# Patient Record
Sex: MALE | Race: WHITE | NOT HISPANIC OR LATINO | Employment: FULL TIME | URBAN - METROPOLITAN AREA
[De-identification: names, ages, dates, MRNs, and addresses within clinical notes are randomized per-mention and may not be internally consistent; named-entity substitution may affect disease eponyms.]

---

## 2022-02-02 ENCOUNTER — HISTORICAL (OUTPATIENT)
Dept: ADMINISTRATIVE | Facility: HOSPITAL | Age: 67
End: 2022-02-02

## 2022-02-14 ENCOUNTER — HISTORICAL (OUTPATIENT)
Dept: ADMINISTRATIVE | Facility: HOSPITAL | Age: 67
End: 2022-02-14

## 2022-07-10 ENCOUNTER — HOSPITAL ENCOUNTER (EMERGENCY)
Facility: HOSPITAL | Age: 67
Discharge: HOME | End: 2022-07-10
Attending: EMERGENCY MEDICINE
Payer: MEDICARE

## 2022-07-10 VITALS
OXYGEN SATURATION: 98 % | TEMPERATURE: 98 F | DIASTOLIC BLOOD PRESSURE: 91 MMHG | WEIGHT: 175.06 LBS | BODY MASS INDEX: 25.06 KG/M2 | HEIGHT: 70 IN | HEART RATE: 93 BPM | SYSTOLIC BLOOD PRESSURE: 140 MMHG | RESPIRATION RATE: 18 BRPM
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
## 2022-07-10 DIAGNOSIS — S01.01XA OCCIPITAL SCALP LACERATION, INITIAL ENCOUNTER: ICD-10-CM

## 2022-07-10 DIAGNOSIS — S09.90XA INJURY OF HEAD, INITIAL ENCOUNTER: ICD-10-CM

## 2022-07-10 PROCEDURE — 90715 TDAP VACCINE 7 YRS/> IM: CPT | Performed by: PHYSICIAN ASSISTANT

## 2022-07-10 PROCEDURE — 90471 IMMUNIZATION ADMIN: CPT | Performed by: PHYSICIAN ASSISTANT

## 2022-07-10 PROCEDURE — 99285 EMERGENCY DEPT VISIT HI MDM: CPT | Mod: 25

## 2022-07-10 PROCEDURE — 12001 RPR S/N/AX/GEN/TRNK 2.5CM/<: CPT

## 2022-07-10 PROCEDURE — 63600175 PHARM REV CODE 636 W HCPCS: Performed by: PHYSICIAN ASSISTANT

## 2022-07-10 RX ORDER — LIDOCAINE HYDROCHLORIDE 20 MG/ML
INJECTION, SOLUTION INFILTRATION; PERINEURAL
Status: DISCONTINUED
Start: 2022-07-10 | End: 2022-07-10 | Stop reason: HOSPADM

## 2022-07-10 RX ORDER — BACITRACIN ZINC 500 UNIT/G
OINTMENT (GRAM) TOPICAL 2 TIMES DAILY
Qty: 14 G | Refills: 0 | Status: SHIPPED | OUTPATIENT
Start: 2022-07-10 | End: 2024-02-10

## 2022-07-10 RX ADMIN — TETANUS TOXOID, REDUCED DIPHTHERIA TOXOID AND ACELLULAR PERTUSSIS VACCINE, ADSORBED 0.5 ML: 5; 2.5; 8; 8; 2.5 SUSPENSION INTRAMUSCULAR at 03:07

## 2022-07-10 NOTE — ED PROVIDER NOTES
Encounter Date: 7/10/2022       History     Chief Complaint   Patient presents with    Fall     Trip and fall @ ground level today around 1415. - thinners, - loc. Denies pain. Abrasion to posterior head. Hematoma behind l ear. Gcs 15.       Fall  The fall occurred while walking. He fell from a height of 1 to 2 ft. He landed on a hard floor. The point of impact was the head. He was ambulatory at the scene. There was no drug use involved in the accident. There was no alcohol use involved in the accident. Pertinent negatives include no back pain, no fever, no numbness, no nausea, no vomiting and no headaches.     Review of patient's allergies indicates:  No Known Allergies  No past medical history on file.  No past surgical history on file.  No family history on file.     Review of Systems   Constitutional: Negative for fever.   HENT: Negative for sore throat.         Positive for head laceration    Respiratory: Negative for shortness of breath.    Cardiovascular: Negative for chest pain.   Gastrointestinal: Negative for nausea and vomiting.   Genitourinary: Negative for dysuria.   Musculoskeletal: Negative for back pain.   Skin: Negative for rash.   Neurological: Negative for weakness, numbness and headaches.   Hematological: Does not bruise/bleed easily.       Physical Exam     Initial Vitals [07/10/22 1459]   BP Pulse Resp Temp SpO2   111/68 85 18 98.4 °F (36.9 °C) 97 %      MAP       --         Physical Exam    Nursing note and vitals reviewed.  Constitutional: He appears well-developed and well-nourished.   HENT:   Head: Head is with laceration.       Right Ear: Tympanic membrane normal. No lacerations. No swelling. Tympanic membrane is not erythematous.   Left Ear: Tympanic membrane normal. No lacerations. No swelling. Tympanic membrane is not erythematous.   Neck: Neck supple.   Normal range of motion.  Cardiovascular: Normal rate.   Pulmonary/Chest: Breath sounds normal.   Musculoskeletal:      Cervical back:  Normal range of motion and neck supple.     Neurological: He is alert and oriented to person, place, and time. He has normal strength.   Skin: Skin is warm and dry.   Psychiatric: His behavior is normal. Thought content normal.         ED Course   Lac Repair    Date/Time: 7/10/2022 6:24 PM  Performed by: FEDERICO Harris  Authorized by: FEDERICO Harris     Universal protocol:     Patient identity confirmed:  Verbally with patient  Anesthesia:     Anesthesia method:  Local infiltration    Local anesthetic:  Lidocaine 1% w/o epi  Laceration details:     Location:  Scalp    Scalp location:  Occipital  Pre-procedure details:     Preparation:  Patient was prepped and draped in usual sterile fashion  Exploration:     Imaging outcome: foreign body not noted      Contaminated: no    Treatment:     Area cleansed with:  Povidone-iodine    Amount of cleaning:  Standard    Irrigation solution:  Sterile saline    Irrigation method:  Tap    Layers/structures repaired:  Deep dermal/superficial fascia  Skin repair:     Repair method:  Staples  Approximation:     Approximation:  Close  Repair type:     Repair type:  Simple  Post-procedure details:     Dressing:  Antibiotic ointment    Procedure completion:  Tolerated      Labs Reviewed - No data to display       Imaging Results          CT Head Without Contrast (Final result)  Result time 07/10/22 16:01:18    Final result by Vane Yung MD (07/10/22 16:01:18)                 Impression:      1. No acute intracranial abnormality.  2. Chronic microvascular ischemic changes.      Electronically signed by: Vane Yung  Date:    07/10/2022  Time:    16:01             Narrative:    EXAMINATION:  CT HEAD WITHOUT CONTRAST    CLINICAL HISTORY:  Head trauma, minor (Age >= 65y);    TECHNIQUE:  Axial scans were obtained from skull base to the vertex.    Coronal and sagittal reconstructions obtained from the axial data.    Automatic exposure control was utilized to limit  radiation dose.    Contrast: None    Radiation Dose:    Total DLP: 944 mGy*cm    COMPARISON:  None    FINDINGS:  There is no acute intracranial hemorrhage or edema. The gray-white matter differentiation is preserved.  Scattered hypodensities in the subcortical and periventricular white matter likely represent chronic microvascular ischemic changes.    There is no mass effect or midline shift.  There is diffuse parenchymal volume loss.  The basal cisterns are patent. There is no abnormal extra-axial fluid collection.    The calvarium and skull base are intact. The visualized paranasal sinuses and the mastoid air cells are clear.  There is a contusion along the left posterior scalp.                                 Medications   Tdap (BOOSTRIX) vaccine injection 0.5 mL (0.5 mLs Intramuscular Given 7/10/22 1545)     Medical Decision Making:   Differential Diagnosis:   Fall   Head laceraton     Length of laceration 2 cm              ED Course as of 08/19/22 1426   Sun Jul 10, 2022   1746 CT Head Without Contrast [YG]      ED Course User Index  [YG] FEDERICO Harris             Clinical Impression:   Final diagnoses:  [W19.XXXA] Fall, initial encounter (Primary)  [S09.90XA] Injury of head, initial encounter  [S01.01XA] Occipital scalp laceration, initial encounter          ED Disposition Condition    Discharge Stable        ED Prescriptions     Medication Sig Dispense Start Date End Date Auth. Provider    bacitracin 500 unit/gram Oint Apply topically 2 (two) times daily. 14 g 7/10/2022  FEDERICO Harris        Follow-up Information     Follow up With Specialties Details Why Contact Info    [facility] - Emergency Dept Emergency Medicine  If symptoms worsen [address] [facility] [id number] [phone]    Fredo Perry MD Family Medicine Schedule an appointment as soon as possible for a visit in 5 days For wound re-check, For suture removal [address] [facility] [address]  [place]  [place] [address]  [phone]             FEDERICO Harris  07/10/22 1829       FEDERICO Harris  08/19/22 2103

## 2022-07-10 NOTE — FIRST PROVIDER EVALUATION
"Medical screening exam completed.  I have conducted a focused provider triage encounter, findings are as follows:    Brief history of present illness:  67-year-old male presents after a trip and fall onto a mat today for evaluation.  Patient reports to falling back hitting head.  Positive loss of consciousness.  Denies any headache, nausea or vomiting.  Abrasion noted to the back of forehead.  Unknown last tetanus shot.    Vitals:    07/10/22 1459   BP: 111/68   Pulse: 85   Resp: 18   Temp: 98.4 °F (36.9 °C)   SpO2: 97%   Weight: 79.4 kg (175 lb 0.7 oz)   Height: 5' 10" (1.778 m)       Pertinent physical exam:  Awake alert and oriented.  bleeding controlled to posterior head    Brief workup plan:  CT head, Tdap    Preliminary workup initiated; this workup will be continued and followed by the physician or advanced practice provider that is assigned to the patient when roomed.  "

## 2022-12-15 ENCOUNTER — HOSPITAL ENCOUNTER (EMERGENCY)
Facility: HOSPITAL | Age: 67
Discharge: HOME | End: 2022-12-15
Attending: INTERNAL MEDICINE
Payer: MEDICARE

## 2022-12-15 VITALS
WEIGHT: 170 LBS | BODY MASS INDEX: 24.34 KG/M2 | SYSTOLIC BLOOD PRESSURE: 126 MMHG | TEMPERATURE: 98 F | DIASTOLIC BLOOD PRESSURE: 75 MMHG | RESPIRATION RATE: 18 BRPM | OXYGEN SATURATION: 100 % | HEIGHT: 70 IN | HEART RATE: 80 BPM

## 2022-12-15 DIAGNOSIS — S42.201A CLOSED TRAUMATIC DISPLACED FRACTURE OF PROXIMAL END OF RIGHT HUMERUS, INITIAL ENCOUNTER: Primary | ICD-10-CM
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
## 2022-12-15 PROCEDURE — 99284 EMERGENCY DEPT VISIT MOD MDM: CPT

## 2022-12-15 RX ORDER — OXYCODONE AND ACETAMINOPHEN 5; 325 MG/1; MG/1
1 TABLET ORAL EVERY 6 HOURS PRN
Qty: 15 TABLET | Refills: 0 | Status: SHIPPED | OUTPATIENT
Start: 2022-12-15 | End: 2024-02-10

## 2022-12-16 NOTE — ED PROVIDER NOTES
"     Source of History:  Patient, no limitations    Chief complaint:  Fall      HPI:  Silvano Perry is a 67 y.o. male presenting with Fall         Patient is here for evaluation after a fall. Patient reportedly was walking when he fell from standing. The accident occurred a few minutes ago. It is reported that the patient did not have LOC. At this time he complains of upper extremity injury.  Patient denies headache, visual change, neck pain, low back pain, chest pain, abdominal pain, and lower extremity injury. Symptoms are exacerbated by use of injured area.       Review of Systems   Constitutional symptoms:  Negative except as documented in HPI.   Skin symptoms:  Negative except as documented in HPI.   HEENT symptoms:  Negative except as documented in HPI.   Respiratory symptoms:  Negative except as documented in HPI.   Cardiovascular symptoms:  Negative except as documented in HPI.   Gastrointestinal symptoms:  Negative except as documented in HPI.    Genitourinary symptoms:  Negative except as documented in HPI.   Musculoskeletal symptoms:  Negative except as documented in HPI.   Neurologic symptoms:  Negative except as documented in HPI.   Psychiatric symptoms:  Negative except as documented in HPI.   Allergy/immunologic symptoms:  Negative except as documented in HPI.             Additional review of systems information: All other systems reviewed and otherwise negative.      Review of patient's allergies indicates:  No Known Allergies    PMH:  As per HPI and below:    No past medical history on file.     No family history on file.    No past surgical history on file.         There is no problem list on file for this patient.       Physical Exam:    /75 (BP Location: Left arm, Patient Position: Sitting)   Pulse 80   Temp 97.7 °F (36.5 °C) (Oral)   Resp 18   Ht 5' 10" (1.778 m)   Wt 77.1 kg (170 lb)   SpO2 100%   BMI 24.39 kg/m²     Nursing note and vital signs reviewed.    General:  Alert, no " acute distress.   Skin: Normal for Ethnic Origin, No cyanosis, mild swelling right elbow  HEENT: Normocephalic and atraumatic, Vision unchanged, Pupils symmetric, No icterus , Nasal mucosa is pink and moist  Cardiovascular:  Regular rate and rhythm, No edema  Chest Wall: No deformity, equal chest rise  Respiratory:  Lungs are clear to auscultation, respirations are non-labored.    Musculoskeletal:  painful ROM right shoulder, Normal perfusion to all extremities  Gastrointestinal:  Soft, Non distended  Neurological:  Alert and oriented, normal motor observed, normal speech observed.    Psychiatric:  Cooperative, appropriate mood & affect.        Labs that have been ordered have been independently reviewed and interpreted by myself.     Old Chart Reviewed.      Initial Impression/ Differential Dx:  Rotator cuff injury, tendonitis, bursitis, arthritis, cellulitis, septic joint, cardiac or intraabdominal cause, cervical radiculopathy, dislocation, fracture       MDM:      Reviewed Nurses Note.    Reviewed Pertinent old records.    Orders Placed This Encounter    X-Ray Shoulder Complete 2 View Right    X-Ray Elbow Complete Right    Ambulatory referral/consult to Orthopedics    oxyCODONE-acetaminophen (PERCOCET) 5-325 mg per tablet                    Labs Reviewed - No data to display       X-Ray Elbow Complete Right   ED Interpretation   A elbow X-Ray was ordered. My reading of this film is Neg. (Pending review by Radiologist.)      X-Ray Shoulder Complete 2 View Right   ED Interpretation   Abnormal   A shoulder X-Ray was ordered. My reading of this film is +Fracture. (Pending review by Radiologist.)             No visits with results within 1 Day(s) from this visit.   Latest known visit with results is:   No results found for any previous visit.       Imaging Results              X-Ray Elbow Complete Right (Preliminary result)  Result time 12/16/22 03:03:20      ED Interpretation by Shlomo Tang DO (12/16/22  03:03:20, [facility] - Emergency Dept, Emergency Medicine)    A elbow X-Ray was ordered. My reading of this film is Neg. (Pending review by Radiologist.)                                      X-Ray Shoulder Complete 2 View Right (Preliminary result)  Result time 12/16/22 03:03:06      ED Interpretation by Shlomo Tang DO (12/16/22 03:03:06, [facility] - Emergency Dept, Emergency Medicine) Flagged as Abnormal    A shoulder X-Ray was ordered. My reading of this film is +Fracture. (Pending review by Radiologist.)                                                                           Diagnostic Impression:    1. Closed traumatic displaced fracture of proximal end of right humerus, initial encounter    2. Fall         ED Disposition Condition    Discharge Stable             Follow-up Information       [facility] - Emergency Dept.    Specialty: Emergency Medicine  Why: If symptoms worsen  Contact information:  [facility] [address]  [facility] [phone]             Schedule an appointment as soon as possible for a visit  with Soren García DO.    Specialty: Orthopedic Surgery  Contact information:  [facility] [address]  [phone]                              ED Prescriptions       Medication Sig Dispense Start Date End Date Auth. Provider    oxyCODONE-acetaminophen (PERCOCET) 5-325 mg per tablet Take 1 tablet by mouth every 6 (six) hours as needed for Pain. 15 tablet 12/15/2022 -- Shlomo Tang DO          Follow-up Information       Follow up With Specialties Details Why Contact Info    [facility] Emergency Dept Emergency Medicine  If symptoms worsen [address]  [facility] [phone]    Soren García DO Orthopedic Surgery Schedule an appointment as soon as possible for a visit   [facility] [address]  [place] [address]  [phone]               Shlomo Tang, DO  12/16/22 0310

## 2022-12-20 ENCOUNTER — OFFICE VISIT (OUTPATIENT)
Dept: ORTHOPEDICS | Facility: CLINIC | Age: 67
End: 2022-12-20
Payer: MEDICARE

## 2022-12-20 VITALS
HEART RATE: 81 BPM | DIASTOLIC BLOOD PRESSURE: 70 MMHG | WEIGHT: 165 LBS | BODY MASS INDEX: 23.68 KG/M2 | SYSTOLIC BLOOD PRESSURE: 108 MMHG

## 2022-12-20 DIAGNOSIS — S42.294A OTHER CLOSED NONDISPLACED FRACTURE OF PROXIMAL END OF RIGHT HUMERUS, INITIAL ENCOUNTER: Primary | ICD-10-CM

## 2022-12-20 PROCEDURE — 99213 PR OFFICE/OUTPT VISIT, EST, LEVL III, 20-29 MIN: ICD-10-PCS | Mod: ,,, | Performed by: ORTHOPAEDIC SURGERY

## 2022-12-20 PROCEDURE — 99213 OFFICE O/P EST LOW 20 MIN: CPT | Mod: ,,, | Performed by: ORTHOPAEDIC SURGERY

## 2022-12-20 NOTE — PROGRESS NOTES
Subjective:       Patient ID: Silvano Perry is a 67 y.o. male.  Chief Complaint   Patient presents with    Right Shoulder - Follow-up    Follow-up     5 days f/u proximal end of Rt humerus, no prior sx.pt went to ER 12/15/22 pt reports no pain and not on any pain meds       HPI:  Patient is 5 days out from right proximal humerus fracture diagnosed in the ER.  He has a previous malunion of for multiple falls.  He is very unhealthy.  He has no pain or swelling of the right shoulder he has no complaints at this time.  Overall he states he is feeling pretty good.  He is here purely because of ER follow-up    ROS:  Constitutional: Denies fever chills  Eyes: No change in vision  ENT: No ringing or current infections  CV: No chest pain  Resp: No labored breathing  MSK: Pain evident at site of injury located in HPI,   Integ: No signs of abrasions or lacerations  Neuro: No numbness or tingling  Lymphatic: No swelling outside the area of injury     Current Outpatient Medications on File Prior to Visit   Medication Sig Dispense Refill    bacitracin 500 unit/gram Oint Apply topically 2 (two) times daily. 14 g 0    oxyCODONE-acetaminophen (PERCOCET) 5-325 mg per tablet Take 1 tablet by mouth every 6 (six) hours as needed for Pain. 15 tablet 0     No current facility-administered medications on file prior to visit.          Objective:      /70 (BP Location: Left arm, Patient Position: Sitting, BP Method: Medium (Automatic))   Pulse 81   Wt 74.8 kg (165 lb)   BMI 23.68 kg/m²   General the patient is alert and oriented x3 no acute distress nontoxic-appearing appropriate affect.    Constitutional: Vital signs are examined and stable.  Resp: No signs of labored breathing              RUE: -Skin:  No tenderness to range of motion of the shoulder No signs of new abrasions or lacerations, no scars           -MSK: STR 5/5 AIN/PIN/Median/Radial/Ulnar motor,           -Neuro:  Sensation intact to light touch C5-T1 dermatomes            -Lymphatic: No signs of  lymphadenopathy,            -CV:  Capillary refill is less than 2 seconds. Radial and ulnar pulses 2/4. Compartments soft and compressible    Body mass index is 23.68 kg/m².  Ideal body weight: 73 kg (160 lb 15 oz)  Adjusted ideal body weight: 73.7 kg (162 lb 9 oz)  Hemoglobin A1c   Date Value Ref Range Status   02/12/2022 11.3 <=7.0      Hgb   Date Value Ref Range Status   02/16/2022 12.5 14.0 - 18.0    02/14/2022 12.3 14.0 - 18.0      No results found for: [id number]  WBC   Date Value Ref Range Status   02/16/2022 6.9 4.5 - 11.5    02/14/2022 13.6 4.5 - 11.5        Radiology:  Three views right proximal humerus from the ER 5 days ago.  No sign of fracture.  Malunion present.        Assessment:         1. Other closed nondisplaced fracture of proximal end of right humerus, initial encounter                Plan:         No follow-ups on file.    Silvano was seen today for follow-up and follow-up.    Diagnoses and all orders for this visit:    Other closed nondisplaced fracture of proximal end of right humerus, initial encounter      Patient here for right shoulder injury.  Patient states he has no pain at this time.  Previous fracture appears to be healed.  Full range of motion at his baseline which is limited.  No other complaints.  Follow-up as needed.        This note/OR report was created with the assistance of  voice recognition software or phone  dictation.  There may be transcription errors as a result of using this technology however minimal. Effort has been made to assure accuracy of transcription but any obvious errors or omissions should be clarified with the author of the document.     Soren García DO  Orthopedic Trauma Surgery  12/20/2022      No future appointments.

## 2024-02-10 ENCOUNTER — HOSPITAL ENCOUNTER (INPATIENT)
Facility: HOSPITAL | Age: 69
LOS: 9 days | Discharge: HOME HEALTH | DRG: 871 | End: 2024-02-21
Attending: EMERGENCY MEDICINE | Admitting: INTERNAL MEDICINE
Payer: MEDICARE

## 2024-02-10 DIAGNOSIS — R07.9 CHEST PAIN: ICD-10-CM

## 2024-02-10 DIAGNOSIS — Z91.199 NONCOMPLIANCE: ICD-10-CM

## 2024-02-10 DIAGNOSIS — R55 SYNCOPE: ICD-10-CM

## 2024-02-10 DIAGNOSIS — N30.00 ACUTE CYSTITIS WITHOUT HEMATURIA: Primary | ICD-10-CM

## 2024-02-10 DIAGNOSIS — R94.31 QT PROLONGATION: ICD-10-CM

## 2024-02-10 DIAGNOSIS — R10.9 LEFT FLANK PAIN: ICD-10-CM

## 2024-02-10 DIAGNOSIS — E11.65 UNCONTROLLED TYPE 2 DIABETES MELLITUS WITH HYPERGLYCEMIA: ICD-10-CM

## 2024-02-10 LAB
ALBUMIN SERPL-MCNC: 3.8 G/DL (ref 3.4–4.8)
ALBUMIN/GLOB SERPL: 1 RATIO (ref 1.1–2)
ALP SERPL-CCNC: 164 UNIT/L (ref 40–150)
ALT SERPL-CCNC: 56 UNIT/L (ref 0–55)
APPEARANCE UR: ABNORMAL
AST SERPL-CCNC: 30 UNIT/L (ref 5–34)
BACTERIA #/AREA URNS AUTO: ABNORMAL /HPF
BASOPHILS # BLD AUTO: 0.08 X10(3)/MCL
BASOPHILS NFR BLD AUTO: 0.6 %
BILIRUB SERPL-MCNC: 0.5 MG/DL
BILIRUB UR QL STRIP.AUTO: NEGATIVE
BUN SERPL-MCNC: 18.7 MG/DL (ref 8.4–25.7)
CALCIUM SERPL-MCNC: 9.9 MG/DL (ref 8.8–10)
CHLORIDE SERPL-SCNC: 101 MMOL/L (ref 98–107)
CO2 SERPL-SCNC: 25 MMOL/L (ref 23–31)
COLOR UR AUTO: YELLOW
CREAT SERPL-MCNC: 1.38 MG/DL (ref 0.73–1.18)
EOSINOPHIL # BLD AUTO: 0.05 X10(3)/MCL (ref 0–0.9)
EOSINOPHIL NFR BLD AUTO: 0.4 %
ERYTHROCYTE [DISTWIDTH] IN BLOOD BY AUTOMATED COUNT: 13.2 % (ref 11.5–17)
EST. AVERAGE GLUCOSE BLD GHB EST-MCNC: 286.2 MG/DL
GFR SERPLBLD CREATININE-BSD FMLA CKD-EPI: 56 MLS/MIN/1.73/M2
GLOBULIN SER-MCNC: 4 GM/DL (ref 2.4–3.5)
GLUCOSE SERPL-MCNC: 444 MG/DL (ref 82–115)
GLUCOSE UR QL STRIP.AUTO: >=1000
HBA1C MFR BLD: 11.6 %
HCT VFR BLD AUTO: 45.4 % (ref 42–52)
HGB BLD-MCNC: 14.8 G/DL (ref 14–18)
IMM GRANULOCYTES # BLD AUTO: 0.03 X10(3)/MCL (ref 0–0.04)
IMM GRANULOCYTES NFR BLD AUTO: 0.2 %
KETONES UR QL STRIP.AUTO: NEGATIVE
LEUKOCYTE ESTERASE UR QL STRIP.AUTO: ABNORMAL
LYMPHOCYTES # BLD AUTO: 1.07 X10(3)/MCL (ref 0.6–4.6)
LYMPHOCYTES NFR BLD AUTO: 7.6 %
MCH RBC QN AUTO: 27.5 PG (ref 27–31)
MCHC RBC AUTO-ENTMCNC: 32.6 G/DL (ref 33–36)
MCV RBC AUTO: 84.4 FL (ref 80–94)
MONOCYTES # BLD AUTO: 0.8 X10(3)/MCL (ref 0.1–1.3)
MONOCYTES NFR BLD AUTO: 5.7 %
NEUTROPHILS # BLD AUTO: 12.04 X10(3)/MCL (ref 2.1–9.2)
NEUTROPHILS NFR BLD AUTO: 85.5 %
NITRITE UR QL STRIP.AUTO: NEGATIVE
NRBC BLD AUTO-RTO: 0 %
PH UR STRIP.AUTO: 6 [PH]
PLATELET # BLD AUTO: 174 X10(3)/MCL (ref 130–400)
PMV BLD AUTO: 12.3 FL (ref 7.4–10.4)
POCT GLUCOSE: 260 MG/DL (ref 70–110)
POCT GLUCOSE: 274 MG/DL (ref 70–110)
POTASSIUM SERPL-SCNC: 5.2 MMOL/L (ref 3.5–5.1)
PROT SERPL-MCNC: 7.8 GM/DL (ref 5.8–7.6)
PROT UR QL STRIP.AUTO: ABNORMAL
RBC # BLD AUTO: 5.38 X10(6)/MCL (ref 4.7–6.1)
RBC #/AREA URNS AUTO: ABNORMAL /HPF
RBC UR QL AUTO: ABNORMAL
SODIUM SERPL-SCNC: 136 MMOL/L (ref 136–145)
SP GR UR STRIP.AUTO: 1.01 (ref 1–1.03)
SQUAMOUS #/AREA URNS AUTO: ABNORMAL /HPF
UROBILINOGEN UR STRIP-ACNC: 0.2
WBC # SPEC AUTO: 14.07 X10(3)/MCL (ref 4.5–11.5)
WBC #/AREA URNS AUTO: >100 /HPF

## 2024-02-10 PROCEDURE — 81003 URINALYSIS AUTO W/O SCOPE: CPT | Performed by: EMERGENCY MEDICINE

## 2024-02-10 PROCEDURE — G0378 HOSPITAL OBSERVATION PER HR: HCPCS

## 2024-02-10 PROCEDURE — A4216 STERILE WATER/SALINE, 10 ML: HCPCS | Performed by: EMERGENCY MEDICINE

## 2024-02-10 PROCEDURE — 63600175 PHARM REV CODE 636 W HCPCS: Performed by: NURSE PRACTITIONER

## 2024-02-10 PROCEDURE — 96374 THER/PROPH/DIAG INJ IV PUSH: CPT

## 2024-02-10 PROCEDURE — 25500020 PHARM REV CODE 255: Performed by: EMERGENCY MEDICINE

## 2024-02-10 PROCEDURE — 80053 COMPREHEN METABOLIC PANEL: CPT | Performed by: EMERGENCY MEDICINE

## 2024-02-10 PROCEDURE — 85025 COMPLETE CBC W/AUTO DIFF WBC: CPT | Performed by: EMERGENCY MEDICINE

## 2024-02-10 PROCEDURE — 87077 CULTURE AEROBIC IDENTIFY: CPT | Performed by: EMERGENCY MEDICINE

## 2024-02-10 PROCEDURE — 96372 THER/PROPH/DIAG INJ SC/IM: CPT | Performed by: NURSE PRACTITIONER

## 2024-02-10 PROCEDURE — 83036 HEMOGLOBIN GLYCOSYLATED A1C: CPT | Performed by: EMERGENCY MEDICINE

## 2024-02-10 PROCEDURE — 96375 TX/PRO/DX INJ NEW DRUG ADDON: CPT

## 2024-02-10 PROCEDURE — 25000003 PHARM REV CODE 250: Performed by: NURSE PRACTITIONER

## 2024-02-10 PROCEDURE — 63600175 PHARM REV CODE 636 W HCPCS: Performed by: EMERGENCY MEDICINE

## 2024-02-10 PROCEDURE — 99285 EMERGENCY DEPT VISIT HI MDM: CPT | Mod: 25

## 2024-02-10 PROCEDURE — 25000003 PHARM REV CODE 250: Performed by: EMERGENCY MEDICINE

## 2024-02-10 PROCEDURE — 96361 HYDRATE IV INFUSION ADD-ON: CPT

## 2024-02-10 RX ORDER — SODIUM CHLORIDE 9 MG/ML
1000 INJECTION, SOLUTION INTRAVENOUS
Status: COMPLETED | OUTPATIENT
Start: 2024-02-10 | End: 2024-02-10

## 2024-02-10 RX ORDER — ACETAMINOPHEN 325 MG/1
650 TABLET ORAL EVERY 8 HOURS PRN
Status: DISCONTINUED | OUTPATIENT
Start: 2024-02-10 | End: 2024-02-21 | Stop reason: HOSPADM

## 2024-02-10 RX ORDER — INSULIN ASPART 100 [IU]/ML
0-5 INJECTION, SOLUTION INTRAVENOUS; SUBCUTANEOUS
Status: DISCONTINUED | OUTPATIENT
Start: 2024-02-10 | End: 2024-02-10

## 2024-02-10 RX ORDER — TALC
6 POWDER (GRAM) TOPICAL NIGHTLY PRN
Status: DISCONTINUED | OUTPATIENT
Start: 2024-02-10 | End: 2024-02-21 | Stop reason: HOSPADM

## 2024-02-10 RX ORDER — INSULIN ASPART 100 [IU]/ML
1-10 INJECTION, SOLUTION INTRAVENOUS; SUBCUTANEOUS
Status: DISCONTINUED | OUTPATIENT
Start: 2024-02-11 | End: 2024-02-21 | Stop reason: HOSPADM

## 2024-02-10 RX ORDER — IBUPROFEN 200 MG
24 TABLET ORAL
Status: DISCONTINUED | OUTPATIENT
Start: 2024-02-10 | End: 2024-02-21 | Stop reason: HOSPADM

## 2024-02-10 RX ORDER — ONDANSETRON HYDROCHLORIDE 2 MG/ML
4 INJECTION, SOLUTION INTRAVENOUS EVERY 8 HOURS PRN
Status: DISCONTINUED | OUTPATIENT
Start: 2024-02-10 | End: 2024-02-21 | Stop reason: HOSPADM

## 2024-02-10 RX ORDER — SODIUM CHLORIDE 9 MG/ML
INJECTION, SOLUTION INTRAVENOUS CONTINUOUS
Status: DISCONTINUED | OUTPATIENT
Start: 2024-02-10 | End: 2024-02-12

## 2024-02-10 RX ORDER — ALUMINUM HYDROXIDE, MAGNESIUM HYDROXIDE, AND SIMETHICONE 1200; 120; 1200 MG/30ML; MG/30ML; MG/30ML
30 SUSPENSION ORAL 4 TIMES DAILY PRN
Status: DISCONTINUED | OUTPATIENT
Start: 2024-02-10 | End: 2024-02-21 | Stop reason: HOSPADM

## 2024-02-10 RX ORDER — SODIUM CHLORIDE 0.9 % (FLUSH) 0.9 %
10 SYRINGE (ML) INJECTION
Status: DISCONTINUED | OUTPATIENT
Start: 2024-02-10 | End: 2024-02-21 | Stop reason: HOSPADM

## 2024-02-10 RX ORDER — IBUPROFEN 200 MG
16 TABLET ORAL
Status: DISCONTINUED | OUTPATIENT
Start: 2024-02-10 | End: 2024-02-21 | Stop reason: HOSPADM

## 2024-02-10 RX ORDER — GLUCAGON 1 MG
1 KIT INJECTION
Status: DISCONTINUED | OUTPATIENT
Start: 2024-02-10 | End: 2024-02-21 | Stop reason: HOSPADM

## 2024-02-10 RX ORDER — NALOXONE HCL 0.4 MG/ML
0.02 VIAL (ML) INJECTION
Status: DISCONTINUED | OUTPATIENT
Start: 2024-02-10 | End: 2024-02-21 | Stop reason: HOSPADM

## 2024-02-10 RX ORDER — HYDROCODONE BITARTRATE AND ACETAMINOPHEN 5; 325 MG/1; MG/1
1 TABLET ORAL EVERY 4 HOURS PRN
Status: DISCONTINUED | OUTPATIENT
Start: 2024-02-10 | End: 2024-02-21 | Stop reason: HOSPADM

## 2024-02-10 RX ADMIN — SODIUM CHLORIDE 1000 ML: 9 INJECTION, SOLUTION INTRAVENOUS at 05:02

## 2024-02-10 RX ADMIN — INSULIN HUMAN 6 UNITS: 100 INJECTION, SOLUTION PARENTERAL at 05:02

## 2024-02-10 RX ADMIN — SODIUM CHLORIDE 1000 ML: 9 INJECTION, SOLUTION INTRAVENOUS at 06:02

## 2024-02-10 RX ADMIN — SODIUM CHLORIDE: 9 INJECTION, SOLUTION INTRAVENOUS at 11:02

## 2024-02-10 RX ADMIN — IOHEXOL 100 ML: 350 INJECTION, SOLUTION INTRAVENOUS at 06:02

## 2024-02-10 RX ADMIN — INSULIN DETEMIR 10 UNITS: 100 INJECTION, SOLUTION SUBCUTANEOUS at 11:02

## 2024-02-10 RX ADMIN — CEFTRIAXONE SODIUM 2 G: 2 INJECTION, POWDER, FOR SOLUTION INTRAMUSCULAR; INTRAVENOUS at 06:02

## 2024-02-10 RX ADMIN — Medication 10 ML: at 11:02

## 2024-02-10 NOTE — ED PROVIDER NOTES
Encounter Date: 2/10/2024       History     Chief Complaint   Patient presents with    Fall     Pt states having a fall at [employer]. Pt states falling forward and hit his head. Pt denies LOC. Pt denies blood thinners     68-year-old male with a history of recurrent falls states that he tripped on his own feet at [employer] and fell onto his left side.  He did hit his head but does not have a headache, nausea, or dizziness.  No neck pain or back pain.  He is having pain in the left side over the ribs and left side of the abdomen.  He is ambulatory but states it his legs are weak but this is chronic.  He has an abrasion to the left elbow.  No pain with arm movement.  He denies any other injuries.        Review of patient's allergies indicates:  No Known Allergies  Past Medical History:   Diagnosis Date    Diabetes mellitus      Past Surgical History:   Procedure Laterality Date    Bilateral great toe amputations       No family history on file.  Social History     Tobacco Use    Smoking status: Never    Smokeless tobacco: Never   Substance Use Topics    Alcohol use: Not Currently    Drug use: Never     Review of Systems   Genitourinary:  Positive for flank pain.   All other systems reviewed and are negative.      Physical Exam     Initial Vitals [02/10/24 1544]   BP Pulse Resp Temp SpO2   136/74 82 20 98.3 °F (36.8 °C) 98 %      MAP       --         Physical Exam    Nursing note and vitals reviewed.  Constitutional: Vital signs are normal.   Frail appearing in no distress   HENT:   Head: Normocephalic and atraumatic.   Mouth/Throat: Oropharynx is clear and moist.   Eyes: Pupils are equal, round, and reactive to light.   Neck: Neck supple. No JVD present.   Cardiovascular:  Normal rate, regular rhythm and normal heart sounds.           Pulmonary/Chest: Breath sounds normal. No respiratory distress.   Mild tenderness of the left lateral ribs with no crepitus, bruising, other sign of injury   Abdominal: Abdomen is soft.  There is no abdominal tenderness.   Musculoskeletal:         General: No edema.      Cervical back: Neck supple. No edema or erythema.     Lymphadenopathy:     He has no cervical adenopathy.   Neurological: He is alert and oriented to person, place, and time. No cranial nerve deficit. GCS score is 15. GCS eye subscore is 4. GCS verbal subscore is 5. GCS motor subscore is 6.   Skin: Skin is warm and dry. Capillary refill takes less than 2 seconds.   Superficial abrasions noted to left elbow and both knees         ED Course   Procedures  Labs Reviewed   COMPREHENSIVE METABOLIC PANEL - Abnormal; Notable for the following components:       Result Value    Potassium Level 5.2 (*)     Glucose Level 444 (*)     Creatinine 1.38 (*)     Protein Total 7.8 (*)     Globulin 4.0 (*)     Albumin/Globulin Ratio 1.0 (*)     Alkaline Phosphatase 164 (*)     Alanine Aminotransferase 56 (*)     All other components within normal limits   CBC WITH DIFFERENTIAL - Abnormal; Notable for the following components:    WBC 14.07 (*)     MCHC 32.6 (*)     MPV 12.3 (*)     Neut # 12.04 (*)     All other components within normal limits   URINALYSIS, REFLEX TO URINE CULTURE - Abnormal; Notable for the following components:    Appearance, UA Cloudy (*)     Protein, UA Trace (*)     Glucose, UA >=1000 (*)     Blood, UA Small (*)     Leukocyte Esterase, UA Trace (*)     All other components within normal limits   HEMOGLOBIN A1C - Abnormal; Notable for the following components:    Hemoglobin A1c 11.6 (*)     All other components within normal limits   URINALYSIS, MICROSCOPIC - Abnormal; Notable for the following components:    Bacteria, UA Moderate (*)     WBC, UA >100 (*)     All other components within normal limits   POCT GLUCOSE - Abnormal; Notable for the following components:    POCT Glucose 260 (*)     All other components within normal limits   CBC W/ AUTO DIFFERENTIAL    Narrative:     The following orders were created for panel order CBC  auto differential.  Procedure                               Abnormality         Status                     ---------                               -----------         ------                     CBC with Differential[[id number]]       Abnormal            Final result                 Please view results for these tests on the individual orders.          Imaging Results              CT Abdomen Pelvis With IV Contrast NO Oral Contrast (Final result)  Result time 02/10/24 18:22:55      Final result by Osvaldo Turner MD (02/10/24 18:22:55)                   Impression:      1. No posttraumatic abnormality of the abdomen and pelvis.  2. Hazy inflammatory stranding in the mesenteric root which is nonspecific but can be seen in the setting of mesenteric adenitis.      Electronically signed by: Osvaldo Turner MD  Date:    02/10/2024  Time:    18:22               Narrative:    EXAMINATION:  CT ABDOMEN PELVIS WITH IV CONTRAST    CLINICAL HISTORY:  Abdominal trauma, blunt;    TECHNIQUE:  Axial images of the abdomen and pelvis were obtained with IV contrast administration.  Coronal and sagittal reconstructions were provided.  Three dimensional and MIP images were obtained and evaluated.  Total DLP was 346 mGy-cm. Dose lowering technique and automated exposure control were utilized for this exam.    COMPARISON:  [facility] 10/08/2014.    FINDINGS:  The bilateral lung bases are clear.  The heart is normal in size.    The liver is homogeneous in attenuation.  The portal vein is patent.  The gallbladder, spleen, pancreas, and adrenal glands are normal.  There are simple renal cysts bilaterally.  There is no hydronephrosis or nephrolithiasis.    The stomach and small bowel are decompressed.  There is hazy inflammatory stranding in the mesentery.  The appendix is normal.  The colon is normal.  The urinary bladder is normal.  There is no pelvic or retroperitoneal adenopathy.  There is no solid organ laceration.  There is no lytic or blastic osseous  lesion.  There is no fracture.                                       Medications   sodium chloride 0.9% flush 10 mL (10 mLs Intravenous Given 2/10/24 2314)   melatonin tablet 6 mg (has no administration in time range)   ondansetron injection 4 mg (has no administration in time range)   acetaminophen tablet 650 mg (has no administration in time range)   HYDROcodone-acetaminophen 5-325 mg per tablet 1 tablet (has no administration in time range)   glucose chewable tablet 16 g (has no administration in time range)   glucose chewable tablet 24 g (has no administration in time range)   glucagon (human recombinant) injection 1 mg (has no administration in time range)   dextrose 10% bolus 125 mL 125 mL (has no administration in time range)   dextrose 10% bolus 250 mL 250 mL (has no administration in time range)   naloxone 0.4 mg/mL injection 0.02 mg (has no administration in time range)   aluminum-magnesium hydroxide-simethicone 200-200-20 mg/5 mL suspension 30 mL (has no administration in time range)   0.9%  NaCl infusion ( Intravenous Rate/Dose Change 2/11/24 0331)   insulin aspart U-100 injection 1-10 Units (has no administration in time range)   insulin detemir U-100 injection 20 Units (has no administration in time range)   cefTRIAXone (Rocephin) 1 g in dextrose 5 % in water (D5W) 100 mL IVPB (MB+) (has no administration in time range)   enoxaparin injection 40 mg (has no administration in time range)   amLODIPine tablet 10 mg (10 mg Oral Given 2/11/24 0018)   hydrALAZINE injection 10 mg (has no administration in time range)   labetaloL injection 10 mg (has no administration in time range)   cloNIDine tablet 0.2 mg (has no administration in time range)   insulin regular injection 6 Units 0.06 mL (6 Units Intravenous Given 2/10/24 1750)   0.9%  NaCl infusion (0 mLs Intravenous Stopped 2/10/24 1953)   cefTRIAXone (ROCEPHIN) 2 g in dextrose 5 % in water (D5W) 100 mL IVPB (MB+) (0 g Intravenous Stopped 2/10/24 1850)    iohexoL (OMNIPAQUE 350) injection 100 mL (100 mLs Intravenous Given 2/10/24 1818)   0.9%  NaCl infusion (0 mLs Intravenous Stopped 2/10/24 1953)     Medical Decision Making  See HPI for narrative    Differential diagnosis includes but is not limited to traumatic injury including head injury, musculoskeletal pain, rib fracture, splenic laceration, spine injury    Amount and/or Complexity of Data Reviewed  Labs: ordered. Decision-making details documented in ED Course.  Radiology: ordered.  Discussion of management or test interpretation with external provider(s): Patient was seen and evaluated in the Emergency Department with history, physical exam, lab work, CT scans.  Case discussed with Naveen CABALLERO and patient is being admitted to the hospitalist service Dr Sargent for overnight treatment to correct his hyperglycemia, manage his pain, and treat the urinary tract infection.  Patient discontinued his home metformin has poor insight contributing to his noncompliance.  However, he is willing to stay in the hospital overnight for treatment.    Risk  OTC drugs.  Prescription drug management.  Decision regarding hospitalization.               ED Course as of 02/11/24 0747   Sat Feb 10, 2024   1744 Sodium: 136 [SH]   1744 Potassium(!): 5.2 [SH]   1744 Chloride: 101 [SH]   1744 CO2: 25 [SH]   1744 BUN: 18.7 [SH]   1744 Creatinine(!): 1.38 [SH]   1744 Glucose(!): 444 [SH]   1802 Patient states that he stopped taking his metformin because he heard someone say that it causes Alzheimer's and cancer. He will need to discontinue it anyway due to IV contrast and does not want to be on this particular medication. [SH]   1816 Anion gap is 10 so it is not elevated. No sign of DKA [SH]      ED Course User Index  [SH] Nena Johnson MD                           Clinical Impression:  Final diagnoses:  [N30.00] Acute cystitis without hematuria (Primary)  [Z91.199] Noncompliance  [R10.9] Left flank pain  [E11.65] Uncontrolled type  2 diabetes mellitus with hyperglycemia          ED Disposition Condition    Observation Stable                Nena Johnson MD  02/11/24 0760

## 2024-02-10 NOTE — Clinical Note
Diagnosis: Acute cystitis without hematuria [124765]   Future Attending Provider: SERENITY GARCIA [[id number]]   Admit to which facility:: [facility] [[id number]]   Special Needs:: No Special Needs [1]

## 2024-02-10 NOTE — ED TRIAGE NOTES
Pt states having a fall at [employer]. Pt states falling forward and hit his head. Pt denies LOC. Pt denies blood thinners

## 2024-02-11 LAB
ALBUMIN SERPL-MCNC: 3.1 G/DL (ref 3.4–4.8)
ALBUMIN/GLOB SERPL: 0.9 RATIO (ref 1.1–2)
ALP SERPL-CCNC: 126 UNIT/L (ref 40–150)
ALT SERPL-CCNC: 43 UNIT/L (ref 0–55)
AST SERPL-CCNC: 24 UNIT/L (ref 5–34)
AV INDEX (PROSTH): 0.8
AV MEAN GRADIENT: 5 MMHG
AV PEAK GRADIENT: 9 MMHG
AV VALVE AREA BY VELOCITY RATIO: 2.06 CM²
AV VALVE AREA: 2.27 CM²
AV VELOCITY RATIO: 0.73
BASOPHILS # BLD AUTO: 0.08 X10(3)/MCL
BASOPHILS NFR BLD AUTO: 0.7 %
BILIRUB SERPL-MCNC: 0.4 MG/DL
BSA FOR ECHO PROCEDURE: 1.9 M2
BUN SERPL-MCNC: 19.2 MG/DL (ref 8.4–25.7)
CALCIUM SERPL-MCNC: 8.5 MG/DL (ref 8.8–10)
CHLORIDE SERPL-SCNC: 108 MMOL/L (ref 98–107)
CHOLEST SERPL-MCNC: 187 MG/DL
CHOLEST/HDLC SERPL: 4 {RATIO} (ref 0–5)
CK SERPL-CCNC: 82 U/L (ref 30–200)
CO2 SERPL-SCNC: 21 MMOL/L (ref 23–31)
CREAT SERPL-MCNC: 0.83 MG/DL (ref 0.73–1.18)
CV ECHO LV RWT: 0.36 CM
DOP CALC AO PEAK VEL: 1.51 M/S
DOP CALC AO VTI: 27.9 CM
DOP CALC LVOT AREA: 2.8 CM2
DOP CALC LVOT DIAMETER: 1.9 CM
DOP CALC LVOT PEAK VEL: 1.1 M/S
DOP CALC LVOT STROKE VOLUME: 63.19 CM3
DOP CALC MV VTI: 18.8 CM
DOP CALCLVOT PEAK VEL VTI: 22.3 CM
E WAVE DECELERATION TIME: 219 MSEC
E/A RATIO: 0.64
E/E' RATIO: 8.13 M/S
ECHO LV POSTERIOR WALL: 0.8 CM (ref 0.6–1.1)
EOSINOPHIL # BLD AUTO: 0.07 X10(3)/MCL (ref 0–0.9)
EOSINOPHIL NFR BLD AUTO: 0.6 %
ERYTHROCYTE [DISTWIDTH] IN BLOOD BY AUTOMATED COUNT: 13.5 % (ref 11.5–17)
FRACTIONAL SHORTENING: 41 % (ref 28–44)
GFR SERPLBLD CREATININE-BSD FMLA CKD-EPI: >60 MLS/MIN/1.73/M2
GLOBULIN SER-MCNC: 3.3 GM/DL (ref 2.4–3.5)
GLUCOSE SERPL-MCNC: 217 MG/DL (ref 82–115)
HCT VFR BLD AUTO: 40.2 % (ref 42–52)
HDLC SERPL-MCNC: 43 MG/DL (ref 35–60)
HGB BLD-MCNC: 13 G/DL (ref 14–18)
IMM GRANULOCYTES # BLD AUTO: 0.03 X10(3)/MCL (ref 0–0.04)
IMM GRANULOCYTES NFR BLD AUTO: 0.2 %
INTERVENTRICULAR SEPTUM: 1 CM (ref 0.6–1.1)
LDLC SERPL CALC-MCNC: 123 MG/DL (ref 50–140)
LEFT ATRIUM SIZE: 3.4 CM
LEFT ATRIUM VOLUME INDEX MOD: 17.7 ML/M2
LEFT ATRIUM VOLUME MOD: 33.9 CM3
LEFT INTERNAL DIMENSION IN SYSTOLE: 2.6 CM (ref 2.1–4)
LEFT VENTRICLE DIASTOLIC VOLUME INDEX: 45.92 ML/M2
LEFT VENTRICLE DIASTOLIC VOLUME: 87.7 ML
LEFT VENTRICLE MASS INDEX: 67 G/M2
LEFT VENTRICLE SYSTOLIC VOLUME INDEX: 12.9 ML/M2
LEFT VENTRICLE SYSTOLIC VOLUME: 24.6 ML
LEFT VENTRICULAR INTERNAL DIMENSION IN DIASTOLE: 4.4 CM (ref 3.5–6)
LEFT VENTRICULAR MASS: 128.02 G
LV LATERAL E/E' RATIO: 8.13 M/S
LV SEPTAL E/E' RATIO: 8.13 M/S
LVOT MG: 3 MMHG
LVOT MV: 0.74 CM/S
LYMPHOCYTES # BLD AUTO: 1.44 X10(3)/MCL (ref 0.6–4.6)
LYMPHOCYTES NFR BLD AUTO: 11.9 %
MAGNESIUM SERPL-MCNC: 1.9 MG/DL (ref 1.6–2.6)
MCH RBC QN AUTO: 27.5 PG (ref 27–31)
MCHC RBC AUTO-ENTMCNC: 32.3 G/DL (ref 33–36)
MCV RBC AUTO: 85.2 FL (ref 80–94)
MONOCYTES # BLD AUTO: 1.06 X10(3)/MCL (ref 0.1–1.3)
MONOCYTES NFR BLD AUTO: 8.8 %
MV MEAN GRADIENT: 1 MMHG
MV PEAK A VEL: 1.02 M/S
MV PEAK E VEL: 0.65 M/S
MV PEAK GRADIENT: 3 MMHG
MV VALVE AREA BY CONTINUITY EQUATION: 3.36 CM2
NEUTROPHILS # BLD AUTO: 9.38 X10(3)/MCL (ref 2.1–9.2)
NEUTROPHILS NFR BLD AUTO: 77.8 %
NRBC BLD AUTO-RTO: 0 %
OHS LV EJECTION FRACTION SIMPSONS BIPLANE MOD: 67 %
PHOSPHATE SERPL-MCNC: 2 MG/DL (ref 2.3–4.7)
PLATELET # BLD AUTO: 166 X10(3)/MCL (ref 130–400)
PMV BLD AUTO: 11.8 FL (ref 7.4–10.4)
POCT GLUCOSE: 184 MG/DL (ref 70–110)
POCT GLUCOSE: 207 MG/DL (ref 70–110)
POCT GLUCOSE: 247 MG/DL (ref 70–110)
POTASSIUM SERPL-SCNC: 3.8 MMOL/L (ref 3.5–5.1)
PROT SERPL-MCNC: 6.4 GM/DL (ref 5.8–7.6)
RA MAJOR: 4.1 CM
RA WIDTH: 3.6 CM
RBC # BLD AUTO: 4.72 X10(6)/MCL (ref 4.7–6.1)
SODIUM SERPL-SCNC: 136 MMOL/L (ref 136–145)
T4 FREE SERPL-MCNC: 0.86 NG/DL (ref 0.7–1.48)
TDI LATERAL: 0.08 M/S
TDI SEPTAL: 0.08 M/S
TDI: 0.08 M/S
TRICUSPID ANNULAR PLANE SYSTOLIC EXCURSION: 2.09 CM
TRIGL SERPL-MCNC: 105 MG/DL (ref 34–140)
TSH SERPL-ACNC: 3.71 UIU/ML (ref 0.35–4.94)
VLDLC SERPL CALC-MCNC: 21 MG/DL
WBC # SPEC AUTO: 12.06 X10(3)/MCL (ref 4.5–11.5)
Z-SCORE OF LEFT VENTRICULAR DIMENSION IN END DIASTOLE: -2
Z-SCORE OF LEFT VENTRICULAR DIMENSION IN END SYSTOLE: -1.91

## 2024-02-11 PROCEDURE — 80061 LIPID PANEL: CPT | Performed by: INTERNAL MEDICINE

## 2024-02-11 PROCEDURE — 82550 ASSAY OF CK (CPK): CPT | Performed by: INTERNAL MEDICINE

## 2024-02-11 PROCEDURE — 83735 ASSAY OF MAGNESIUM: CPT | Performed by: INTERNAL MEDICINE

## 2024-02-11 PROCEDURE — 63600175 PHARM REV CODE 636 W HCPCS: Performed by: INTERNAL MEDICINE

## 2024-02-11 PROCEDURE — 25000003 PHARM REV CODE 250: Performed by: INTERNAL MEDICINE

## 2024-02-11 PROCEDURE — 96372 THER/PROPH/DIAG INJ SC/IM: CPT | Performed by: INTERNAL MEDICINE

## 2024-02-11 PROCEDURE — 85025 COMPLETE CBC W/AUTO DIFF WBC: CPT | Performed by: NURSE PRACTITIONER

## 2024-02-11 PROCEDURE — G0378 HOSPITAL OBSERVATION PER HR: HCPCS

## 2024-02-11 PROCEDURE — 25000003 PHARM REV CODE 250: Performed by: EMERGENCY MEDICINE

## 2024-02-11 PROCEDURE — 97162 PT EVAL MOD COMPLEX 30 MIN: CPT

## 2024-02-11 PROCEDURE — 84100 ASSAY OF PHOSPHORUS: CPT | Performed by: INTERNAL MEDICINE

## 2024-02-11 PROCEDURE — 80053 COMPREHEN METABOLIC PANEL: CPT | Performed by: NURSE PRACTITIONER

## 2024-02-11 PROCEDURE — 84439 ASSAY OF FREE THYROXINE: CPT | Performed by: INTERNAL MEDICINE

## 2024-02-11 PROCEDURE — 84443 ASSAY THYROID STIM HORMONE: CPT | Performed by: INTERNAL MEDICINE

## 2024-02-11 RX ORDER — HYDRALAZINE HYDROCHLORIDE 20 MG/ML
10 INJECTION INTRAMUSCULAR; INTRAVENOUS EVERY 4 HOURS PRN
Status: DISCONTINUED | OUTPATIENT
Start: 2024-02-11 | End: 2024-02-21 | Stop reason: HOSPADM

## 2024-02-11 RX ORDER — ENOXAPARIN SODIUM 100 MG/ML
40 INJECTION SUBCUTANEOUS EVERY 24 HOURS
Status: DISCONTINUED | OUTPATIENT
Start: 2024-02-11 | End: 2024-02-21 | Stop reason: HOSPADM

## 2024-02-11 RX ORDER — AMLODIPINE BESYLATE 5 MG/1
10 TABLET ORAL DAILY
Status: DISCONTINUED | OUTPATIENT
Start: 2024-02-11 | End: 2024-02-15

## 2024-02-11 RX ORDER — SODIUM,POTASSIUM PHOSPHATES 280-250MG
1 POWDER IN PACKET (EA) ORAL
Status: COMPLETED | OUTPATIENT
Start: 2024-02-11 | End: 2024-02-11

## 2024-02-11 RX ORDER — LOSARTAN POTASSIUM 25 MG/1
25 TABLET ORAL DAILY
Status: DISCONTINUED | OUTPATIENT
Start: 2024-02-11 | End: 2024-02-12

## 2024-02-11 RX ORDER — LABETALOL HYDROCHLORIDE 5 MG/ML
10 INJECTION, SOLUTION INTRAVENOUS EVERY 4 HOURS PRN
Status: DISCONTINUED | OUTPATIENT
Start: 2024-02-11 | End: 2024-02-21 | Stop reason: HOSPADM

## 2024-02-11 RX ORDER — CLONIDINE HYDROCHLORIDE 0.2 MG/1
0.2 TABLET ORAL 3 TIMES DAILY PRN
Status: DISCONTINUED | OUTPATIENT
Start: 2024-02-11 | End: 2024-02-21 | Stop reason: HOSPADM

## 2024-02-11 RX ADMIN — POTASSIUM & SODIUM PHOSPHATES POWDER PACK 280-160-250 MG 1 PACKET: 280-160-250 PACK at 08:02

## 2024-02-11 RX ADMIN — INSULIN ASPART 2 UNITS: 100 INJECTION, SOLUTION INTRAVENOUS; SUBCUTANEOUS at 05:02

## 2024-02-11 RX ADMIN — LOSARTAN POTASSIUM 25 MG: 25 TABLET, FILM COATED ORAL at 03:02

## 2024-02-11 RX ADMIN — HYDROCODONE BITARTRATE AND ACETAMINOPHEN 1 TABLET: 5; 325 TABLET ORAL at 09:02

## 2024-02-11 RX ADMIN — INSULIN DETEMIR 20 UNITS: 100 INJECTION, SOLUTION SUBCUTANEOUS at 08:02

## 2024-02-11 RX ADMIN — ENOXAPARIN SODIUM 40 MG: 100 INJECTION SUBCUTANEOUS at 05:02

## 2024-02-11 RX ADMIN — Medication 6 MG: at 08:02

## 2024-02-11 RX ADMIN — AMLODIPINE BESYLATE 10 MG: 5 TABLET ORAL at 12:02

## 2024-02-11 RX ADMIN — SODIUM CHLORIDE: 9 INJECTION, SOLUTION INTRAVENOUS at 11:02

## 2024-02-11 RX ADMIN — CEFTRIAXONE 1 G: 1 INJECTION, POWDER, FOR SOLUTION INTRAMUSCULAR; INTRAVENOUS at 05:02

## 2024-02-11 RX ADMIN — SODIUM CHLORIDE: 9 INJECTION, SOLUTION INTRAVENOUS at 09:02

## 2024-02-11 RX ADMIN — AMLODIPINE BESYLATE 10 MG: 5 TABLET ORAL at 09:02

## 2024-02-11 RX ADMIN — POTASSIUM & SODIUM PHOSPHATES POWDER PACK 280-160-250 MG 1 PACKET: 280-160-250 PACK at 11:02

## 2024-02-11 RX ADMIN — POTASSIUM & SODIUM PHOSPHATES POWDER PACK 280-160-250 MG 1 PACKET: 280-160-250 PACK at 05:02

## 2024-02-11 NOTE — PROGRESS NOTES
[facility]  Hospital Medicine Progress Note        Chief Complaint: Inpatient Follow-up     HPI:    68 year old male who PMH includes DM type II with prior DM foot wound several years ago; presents to the ED at [facility] via EMS s/p fall at [employer] store. Pt reports he tripped on his feet, landed on his left side, he does report hitting his head. Denies any LOC, headaches, neck pain, back pain, N/V or dizziness. Pt does endorse left rib, side and abdominal pain. Pt has left elbow abrasion from the fall.  Patient reports he ambulates without assistance however does have some weakness to his lower legs which is chronic.  No reported chest pain, shortness and breath, fever, chills, cough, congestion, or any sick contacts.  Lab work reviewed demonstrated WBCs 14.07, potassium 5.2, BUN 18.7, creatinine 1.38, glucose 444; alkaline phosphatase 164, AST 30, ALT 56, hemoglobin A1c 11.6; other indices unremarkable.  Urinalysis done suggestive of acute UTI.  CTA of the abdomen and pelvis with contrast impression reviewed demonstrated no posttraumatic abnormality of the abdomen and pelvis, hazy inflammatory stranding in the mesenteric root which is nonspecific but can be seen in the setting of mesenteric adenitis.  Patient was transferred from [facility] ED and admitted to [facility] on 2/10/2024.   Initial vital signs /74 pulse 82 respirations 20 temperature 98.3° F O2 saturation 98% on room air.  Patient received IV hydration, sectioned and regular insulin IV push, and started on IV ceftriaxone therapy at outlying ED. patient repeat glucose trended down to 260.  Patient is admitted to hospital medicine services for further management.      Interval Hx:   No acute events reported overnight.  Seen at bedside.  Patient was resting comfortably, reports feeling better since admission feels improved overall.  Patient reported he does not work, lives alone      Objective/physical exam:  General: In no acute  distress, afebrile  Chest:  Unlabored breathing on room air   Heart: +S1, S2 normal rate  Abdomen: Soft  MSK: Warm, no lower extremity edema  Neurologic: Alert and oriented moving all extremities spontaneous  VITAL SIGNS: 24 HRS MIN & MAX LAST   Temp  Min: 97.7 °F (36.5 °C)  Max: 98.4 °F (36.9 °C) 98 °F (36.7 °C)   BP  Min: 118/74  Max: 178/89 118/74   Pulse  Min: 76  Max: 95  77   Resp  Min: 17  Max: 20 17   SpO2  Min: 95 %  Max: 99 % 97 %     I have reviewed the following labs:  Recent Labs   Lab 02/10/24  1633 02/11/24  0432   WBC 14.07* 12.06*   RBC 5.38 4.72   HGB 14.8 13.0*   HCT 45.4 40.2*   MCV 84.4 85.2   MCH 27.5 27.5   MCHC 32.6* 32.3*   RDW 13.2 13.5    166   MPV 12.3* 11.8*     Recent Labs   Lab 02/10/24  1633 02/11/24  0432    136   K 5.2* 3.8   CO2 25 21*   BUN 18.7 19.2   CREATININE 1.38* 0.83   CALCIUM 9.9 8.5*   MG  --  1.90   ALBUMIN 3.8 3.1*   ALKPHOS 164* 126   ALT 56* 43   AST 30 24   BILITOT 0.5 0.4     Microbiology Results (last 7 days)       Procedure Component Value Units Date/Time    Urine culture [[id number]] Collected: 02/10/24 5100    Order Status: Completed Specimen: Urine Updated: 02/11/24 0629     Urine Culture No Growth At 24 Hours             See below for Radiology    Scheduled Med:   amLODIPine  10 mg Oral Daily    cefTRIAXone (Rocephin) IV (PEDS and ADULTS)  1 g Intravenous Q24H    enoxparin  40 mg Subcutaneous Q24H (prophylaxis, 1700)    insulin detemir U-100  20 Units Subcutaneous QHS      Continuous Infusions:   sodium chloride 0.9% 125 mL/hr at 02/11/24 0331      PRN Meds:  acetaminophen, aluminum-magnesium hydroxide-simethicone, cloNIDine, dextrose 10%, dextrose 10%, glucagon (human recombinant), glucose, glucose, hydrALAZINE, HYDROcodone-acetaminophen, insulin aspart U-100, labetalol, melatonin, naloxone, ondansetron, sodium chloride 0.9%     Assessment/Plan:  Ground level fall, suspecting Near-syncope   Uncontrolled T2DM with hyperglycemia, A1c 11.6  Acute  UTI  Uncontrolled hypertension  Depression    CT head from this AM noted, no acute intra cranial process  Follow up echo, carotid ultrasound monitor on tele  Labs from this a.m. showed a.m. glucose 217, creatinine improved 0.83, CK 82, WBC 12.06   Micro noted, urine culture pending   Continue IV ceftriaxone, follow urine cultures   Continue IV hydration  Patient with uncontrolled diabetes A1c 11.6%.  Detemir 20 units nightly and monitor blood sugars and cover with sliding scale a.c. HS   Monitor blood pressure, amlodipine 10 mg  add  losartan   Case management/social work consult for PCP referral and insurance issues  Care discussed with patient's nurse        VTE prophylaxis:  Lovenox    Patient condition:  Fair    Anticipated discharge and Disposition:   To be decided        _____________________________________________________________________    Nutrition Status:    Radiology:  I have personally reviewed the following imaging and agree with the radiologist.     CT Head Without Contrast  Narrative: Technique:CT of the head was performed without intravenous contrast with axial as well as coronal and sagittal images.    Automated exposure control was utilized to minimize radiation dose.      Comparison:None.    Clinical history:Fall.    Findings:    Hemorrhage:No acute intracranial hemorrhage is seen.    CSF spaces:The ventricles, sulci and basal cisterns all appear moderately prominent consistent with global cerebral atrophy.    Brain parenchyma:Mild microvascular change is seen in portions of the periventricular and deep white matter tracts.    Vascular territory infarcts:    Lacunar infarcts:There are lacunar infarcts on the left lentiform nucleus posterior limb of the internal capsule and left centrum semiovale.    Cerebellum:Unremarkable.    Calvarium:No acute linear or depressed skull fracture is seen.    Maxillofacial Structures:    Paranasal sinuses:The visualized paranasal sinuses appear clear with  no mucoperiosteal thickening or air fluid levels identified.  Impression: Impression:    No acute intracranial process identified. Details and other findings as noted above.    No significant discrepancy with overnight report.    Electronically signed by: Raoul Randall  Date:    02/11/2024  Time:    08:19      Morena Chavez MD   02/11/2024

## 2024-02-11 NOTE — H&P
[facility] Hospital Medicine   History & Physical Note      Patient Name: Silvano Perry  : 1955  MRN: [id number]  Patient Class: OP- Observation   Admission Date: 2/10/2024   Length of Stay: 0  Admitting Service:  Service  Attending Physician: Dr. Lilly Elam  PCP: Fredo Perry MD  History Source: Patient, patient's family, and EMR.   Face-to-Face encounter date: 2/10/2024  Code status: Full code    Chief Complaint   Fall (Pt states having a fall at [employer]. Pt states falling forward and hit his head. Pt denies LOC. Pt denies blood thinners)      History of Present Illness   Silvano Perry is a 68 year old male who PMH includes DM type II with prior DM foot wound several years ago; presents to the ED at [facility] via EMS s/p fall at [employer] store. Pt reports he tripped on his feet, landed on his left side, he does report hitting his head. Denies any LOC, headaches, neck pain, back pain, N/V or dizziness. Pt does endorse left rib, side and abdominal pain. Pt has left elbow abrasion from the fall.  Patient reports he ambulates without assistance however does have some weakness to his lower legs which is chronic.  No reported chest pain, shortness and breath, fever, chills, cough, congestion, or any sick contacts.  Lab work reviewed demonstrated WBCs 14.07, potassium 5.2, BUN 18.7, creatinine 1.38, glucose 444; alkaline phosphatase 164, AST 30, ALT 56, hemoglobin A1c 11.6; other indices unremarkable.  Urinalysis done suggestive of acute UTI.  CTA of the abdomen and pelvis with contrast impression reviewed demonstrated no posttraumatic abnormality of the abdomen and pelvis, hazy inflammatory stranding in the mesenteric root which is nonspecific but can be seen in the setting of mesenteric adenitis.  Patient was transferred from [facility] ED and admitted to [facility] on 2/10/2024.   Initial vital signs /74 pulse 82 respirations 20 temperature 98.3° F O2 saturation 98% on room air.  Patient  received IV hydration, sectioned and regular insulin IV push, and started on IV ceftriaxone therapy at outlying ED. patient repeat glucose trended down to 260.  Patient is admitted to hospital medicine services for further management.    ROS   Except as documented, all other systems reviewed and negative     Past Medical History   Dm type 2    Past Surgical History   Endoscopy  Bilateral great toe amputations    Social History   Denies any tobacco use  Denies any illicit drug use   Denies any alcohol use   Lives with family     Family History   Reviewed and negative    Allergies   Patient has no known allergies.    Home Medications   As documented:     Prior to Admission medications    Medication Sig Start Date End Date Taking? Authorizing Provider   bacitracin 500 unit/gram Oint Apply topically 2 (two) times daily. 7/10/22 2/10/24  Megha Ceron PA   oxyCODONE-acetaminophen (PERCOCET) 5-325 mg per tablet Take 1 tablet by mouth every 6 (six) hours as needed for Pain. 12/15/22 2/10/24  Shlomo Tang, DO        Inpatient Medications   Scheduled Meds: see mar    Continuous Infusions   sodium chloride 0.9%       PRN Meds  acetaminophen, aluminum-magnesium hydroxide-simethicone, dextrose 10%, dextrose 10%, glucagon (human recombinant), glucose, glucose, HYDROcodone-acetaminophen, insulin aspart U-100, melatonin, naloxone, ondansetron, sodium chloride 0.9%    Physical Exam   Vital Signs  Temp:  [98.1 °F (36.7 °C)-98.3 °F (36.8 °C)]   Pulse:  [80-95]   Resp:  [18-20]   BP: (136-178)/(74-94)   SpO2:  [95 %-99 %]    General:  Well-developed well-nourished chronically ill-appearing male looks older than stated age; nontoxic, Appears comfortable, no family at bedside  HEENT: NC/AT  Neck:  No JVD  Chest: CTABL  CVS: Regular rhythm. Normal S1/S2.  Abdomen: obese, nondistended, normoactive BS, soft and non-tender.  MSK:  Moves all extremities, no deformities  Skin: Warm and dry, thick fungal fingernails; left elbow  "abrasion, bilat knee abrasions, foot abrasion; see media              Neuro: AAOx3, no focal neurological deficit  Psych: Cooperative    Labs     Recent Labs     02/10/24  1633   WBC 14.07*   RBC 5.38   HGB 14.8   HCT 45.4   MCV 84.4   MCH 27.5   MCHC 32.6*   RDW 13.2        No results for input(s): "LACTIC" in the last 72 hours.  No results for input(s): "INR", "APTT", "D-DIMER" in the last 72 hours.  Recent Labs     02/10/24  1745   HGBA1C 11.6*      Recent Labs     02/10/24  1633      K 5.2*   CHLORIDE 101   CO2 25   BUN 18.7   CREATININE 1.38*   GLUCOSE 444*   CALCIUM 9.9   ALBUMIN 3.8   GLOBULIN 4.0*   ALKPHOS 164*   ALT 56*   AST 30   BILITOT 0.5     No results for input(s): "BNP", "CPK", "TROPONINI" in the last 72 hours.       Microbiology Results (last 7 days)       Procedure Component Value Units Date/Time    Urine culture [[id number]] Collected: 02/10/24 1745    Order Status: Sent Specimen: Urine Updated: 02/10/24 1803           Imaging     CT Abdomen Pelvis With IV Contrast NO Oral Contrast   Final Result      1. No posttraumatic abnormality of the abdomen and pelvis.   2. Hazy inflammatory stranding in the mesenteric root which is nonspecific but can be seen in the setting of mesenteric adenitis.         Electronically signed by: Osvaldo Turner MD   Date:    02/10/2024   Time:    18:22        Assessment & Plan   ASSESSMENT:  Acute UTI-with hematuria-POA   DM type 2 with hyperglycemia and elevated A1c-POA   Leukocytosis-suspect secondary to acute UTI-POA  JULEE-with elevated creatinine-POA   Fall-ground level-POA  Hyperkalemia-POA   Contusion/abrasion- to bilat knees and left elbow, secondary to trauma from fall-POA   Weakness-POA     PLAN:  Gentle IV hydration   Follow cultures and sensitivities  Continue with IV ceftriaxone therapy   Accu-Cheks and sliding scale coverage   Levimir 20 units q HS  Fall precautions  PT OT eval and treat   Resume home medication as deemed necessary   Repeat lab work " in a.m.     -VTE Prophylaxis: Lovenox    I, ZANDRA Cruz have reviewed and discussed the case with Dr. Elam. Please see the following addendum for further assessment and plan from there attending MD.  ZANDRA Waters

## 2024-02-11 NOTE — NURSING
Nurses Note -- 4 Eyes      2/11/2024   6:23 AM      Skin assessed during: Admit      [] No Altered Skin Integrity Present    []Prevention Measures Documented      [x] Yes- Altered Skin Integrity Present or Discovered   [x] LDA Added if Not in Epic (Describe Wound)   [x] New Altered Skin Integrity was Present on Admit and Documented in LDA   [x] Wound Image Taken    Wound Care Consulted? No    Attending Nurse:  Shannan Jefferson RN/Staff Member:  Felicia

## 2024-02-11 NOTE — PLAN OF CARE
Problem: Physical Therapy  Goal: Physical Therapy Goal  Description: Goals to be met by: 3/11/24     Patient will increase functional independence with mobility by performin. Supine to sit with [place]  2. Sit to supine with [place]  3. Sit to stand transfer with Modified [place]  4. Gait  x 300 feet with Modified [place] using Rolling Walker.     Outcome: Ongoing, Progressing

## 2024-02-11 NOTE — PT/OT/SLP EVAL
Physical Therapy Evaluation    Patient Name:  Silvano Perry   MRN:  [id number]    Recommendations:     Discharge therapy intensity: Low Intensity Therapy   Discharge Equipment Recommendations: none   Barriers to discharge: Impaired mobility and Ongoing medical needs    Assessment:     Silvano Perry is a 68 y.o. male admitted with a medical diagnosis of DM. Prior to admit, patient lived alone in a 1st-floor apartment. At baseline, he is independent.  He presents with the following impairments/functional limitations: weakness, impaired endurance, gait instability, impaired balance, decreased safety awareness, pain. He required MIN A for bed mobility. MOD A for sit<>stand. Ambulated 124 ft with RW & CGA. Slowed pace. Patient to benefit from skilled PT to address deficits, improve functional mobility, and progress towards functional independence. Recommending low intensity therapy and use of RW at discharge. Progress as tolerated.    Rehab Prognosis: Good; patient would benefit from acute skilled PT services to address these deficits and reach maximum level of function.    Recent Surgery: * No surgery found *      Plan:     During this hospitalization, patient to be seen 5 x/week to address the identified rehab impairments via gait training, therapeutic activities, therapeutic exercises, neuromuscular re-education and progress toward the following goals:    Plan of Care Expires:  03/11/24    Subjective     Chief Complaint: pain  Patient/Family Comments/goals: none  Pain/Comfort:  Pain Rating 1: 6/10  Location - Side 1: Left  Location 1: flank  Pain Addressed 1: Reposition, Distraction  Pain Rating Post-Intervention 1: 6/10    Patients cultural, spiritual, Hindu conflicts given the current situation: no    Living Environment:  Lives alone in a 1st-floor apartment.   Prior to admission, patients level of function was independent.  Equipment used at home: none (owns RW).  DME owned (not currently used): rolling walker.   Upon discharge, patient will have assistance from no one.    Objective:     Communicated with nurse prior to session.  Patient found supine with telemetry, peripheral IV, PureWick  upon PT entry to room.    General Precautions: Standard, fall  Orthopedic Precautions:N/A   Braces: N/A  Respiratory Status: Room air    Exams:  Cognitive Exam:  Patient is oriented to Person, Place, Time, and Situation  Sensation: -       Intact  RLE ROM: WFL  RLE Strength: 4/5 grossly  LLE ROM: WFL  LLE Strength: 4/5 grossly  Skin integrity: Visible skin intact      Functional Mobility:  Bed Mobility:  Supine to Sit: minimum assistance  Transfers:  Sit to Stand:  moderate assistance with rolling walker  Gait: 124 with RW & CGA. Slowed pace  Balance: fair      AM-PAC 6 CLICK MOBILITY  Total Score:16     Education Provided:  Role and goals of PT, transfer training, bed mobility, gait training, balance training, safety awareness, assistive device, strengthening exercises, and importance of participating in PT to return to PLOF.    Patient left up in chair with all lines intact, call button in reach, and educated on calling for assistance when ready to return to bed .    GOALS:   Multidisciplinary Problems       Physical Therapy Goals          Problem: Physical Therapy    Goal Priority Disciplines Outcome Goal Variances Interventions   Physical Therapy Goal     PT, PT/OT Ongoing, Progressing     Description: Goals to be met by: 3/11/24     Patient will increase functional independence with mobility by performin. Supine to sit with [place]  2. Sit to supine with [place]  3. Sit to stand transfer with Modified [place]  4. Gait  x 300 feet with Modified [place] using Rolling Walker.                          History:     Past Medical History:   Diagnosis Date    Diabetes mellitus        Past Surgical History:   Procedure Laterality Date    Bilateral great toe amputations         Time Tracking:     PT Received On:  02/11/24  PT Start Time: 0702     PT Stop Time: 0724  PT Total Time (min): 22 min     Billable Minutes: Evaluation 22 minutes      02/11/2024

## 2024-02-11 NOTE — DISCHARGE INSTRUCTIONS
Resume your metformin on Monday.  Drink plenty of fluids.  Take your antibiotics as prescribed. Follow up with your doctor for further care.  Do not take medical advise from people who are not qualified to give you advise..  Reviewed with patient

## 2024-02-12 LAB
ANION GAP SERPL CALC-SCNC: 4 MEQ/L
BASOPHILS # BLD AUTO: 0.04 X10(3)/MCL
BASOPHILS NFR BLD AUTO: 0.5 %
BUN SERPL-MCNC: 13.3 MG/DL (ref 8.4–25.7)
CALCIUM SERPL-MCNC: 7.7 MG/DL (ref 8.8–10)
CHLORIDE SERPL-SCNC: 113 MMOL/L (ref 98–107)
CO2 SERPL-SCNC: 20 MMOL/L (ref 23–31)
CREAT SERPL-MCNC: 0.66 MG/DL (ref 0.73–1.18)
CREAT/UREA NIT SERPL: 20
EOSINOPHIL # BLD AUTO: 0.15 X10(3)/MCL (ref 0–0.9)
EOSINOPHIL NFR BLD AUTO: 1.9 %
ERYTHROCYTE [DISTWIDTH] IN BLOOD BY AUTOMATED COUNT: 13.8 % (ref 11.5–17)
GFR SERPLBLD CREATININE-BSD FMLA CKD-EPI: >60 MLS/MIN/1.73/M2
GLUCOSE SERPL-MCNC: 122 MG/DL (ref 82–115)
HCT VFR BLD AUTO: 35.7 % (ref 42–52)
HGB BLD-MCNC: 11.6 G/DL (ref 14–18)
IMM GRANULOCYTES # BLD AUTO: 0.02 X10(3)/MCL (ref 0–0.04)
IMM GRANULOCYTES NFR BLD AUTO: 0.3 %
LEFT CCA DIST DIAS: 11 CM/S
LEFT CCA DIST SYS: 77 CM/S
LEFT CCA PROX DIAS: 11 CM/S
LEFT CCA PROX SYS: 72 CM/S
LEFT ECA DIAS: 2 CM/S
LEFT ECA SYS: 101 CM/S
LEFT ICA DIST DIAS: 11 CM/S
LEFT ICA DIST SYS: 87 CM/S
LEFT ICA MID DIAS: 37 CM/S
LEFT ICA MID SYS: 129 CM/S
LEFT ICA PROX DIAS: 45 CM/S
LEFT ICA PROX SYS: 138 CM/S
LEFT VERTEBRAL SYS: 63 CM/S
LYMPHOCYTES # BLD AUTO: 1.22 X10(3)/MCL (ref 0.6–4.6)
LYMPHOCYTES NFR BLD AUTO: 15.7 %
MCH RBC QN AUTO: 27.7 PG (ref 27–31)
MCHC RBC AUTO-ENTMCNC: 32.5 G/DL (ref 33–36)
MCV RBC AUTO: 85.2 FL (ref 80–94)
MONOCYTES # BLD AUTO: 0.7 X10(3)/MCL (ref 0.1–1.3)
MONOCYTES NFR BLD AUTO: 9 %
NEUTROPHILS # BLD AUTO: 5.66 X10(3)/MCL (ref 2.1–9.2)
NEUTROPHILS NFR BLD AUTO: 72.6 %
NRBC BLD AUTO-RTO: 0 %
OHS CV CAROTID RIGHT ICA EDV HIGHEST: 23
OHS CV CAROTID ULTRASOUND LEFT ICA/CCA RATIO: 1.79
OHS CV CAROTID ULTRASOUND RIGHT ICA/CCA RATIO: 2.04
OHS CV PV CAROTID LEFT HIGHEST CCA: 77
OHS CV PV CAROTID LEFT HIGHEST ICA: 138
OHS CV PV CAROTID RIGHT HIGHEST CCA: 89
OHS CV PV CAROTID RIGHT HIGHEST ICA: 153
OHS CV US CAROTID LEFT HIGHEST EDV: 45
PLATELET # BLD AUTO: 145 X10(3)/MCL (ref 130–400)
PMV BLD AUTO: 12.1 FL (ref 7.4–10.4)
POCT GLUCOSE: 129 MG/DL (ref 70–110)
POCT GLUCOSE: 158 MG/DL (ref 70–110)
POCT GLUCOSE: 201 MG/DL (ref 70–110)
POCT GLUCOSE: 293 MG/DL (ref 70–110)
POTASSIUM SERPL-SCNC: 3.7 MMOL/L (ref 3.5–5.1)
RBC # BLD AUTO: 4.19 X10(6)/MCL (ref 4.7–6.1)
RIGHT CCA DIST DIAS: 0 CM/S
RIGHT CCA DIST SYS: 75 CM/S
RIGHT CCA PROX DIAS: 10 CM/S
RIGHT CCA PROX SYS: 89 CM/S
RIGHT ECA DIAS: 1 CM/S
RIGHT ECA SYS: 120 CM/S
RIGHT ICA DIST DIAS: 9 CM/S
RIGHT ICA DIST SYS: 81 CM/S
RIGHT ICA MID DIAS: 14 CM/S
RIGHT ICA MID SYS: 92 CM/S
RIGHT ICA PROX DIAS: 23 CM/S
RIGHT ICA PROX SYS: 153 CM/S
RIGHT VERTEBRAL SYS: 32 CM/S
SODIUM SERPL-SCNC: 137 MMOL/L (ref 136–145)
WBC # SPEC AUTO: 7.79 X10(3)/MCL (ref 4.5–11.5)

## 2024-02-12 PROCEDURE — 11000001 HC ACUTE MED/SURG PRIVATE ROOM

## 2024-02-12 PROCEDURE — 97166 OT EVAL MOD COMPLEX 45 MIN: CPT

## 2024-02-12 PROCEDURE — 25000003 PHARM REV CODE 250: Performed by: INTERNAL MEDICINE

## 2024-02-12 PROCEDURE — 25000003 PHARM REV CODE 250: Performed by: EMERGENCY MEDICINE

## 2024-02-12 PROCEDURE — 85025 COMPLETE CBC W/AUTO DIFF WBC: CPT | Performed by: INTERNAL MEDICINE

## 2024-02-12 PROCEDURE — 21400001 HC TELEMETRY ROOM

## 2024-02-12 PROCEDURE — 63600175 PHARM REV CODE 636 W HCPCS: Performed by: INTERNAL MEDICINE

## 2024-02-12 PROCEDURE — 80048 BASIC METABOLIC PNL TOTAL CA: CPT | Performed by: INTERNAL MEDICINE

## 2024-02-12 RX ORDER — ASPIRIN 81 MG/1
81 TABLET ORAL DAILY
Status: DISCONTINUED | OUTPATIENT
Start: 2024-02-12 | End: 2024-02-21 | Stop reason: HOSPADM

## 2024-02-12 RX ORDER — ATORVASTATIN CALCIUM 40 MG/1
40 TABLET, FILM COATED ORAL NIGHTLY
Status: DISCONTINUED | OUTPATIENT
Start: 2024-02-12 | End: 2024-02-16

## 2024-02-12 RX ORDER — POLYETHYLENE GLYCOL 3350 17 G/17G
17 POWDER, FOR SOLUTION ORAL 2 TIMES DAILY
Status: DISCONTINUED | OUTPATIENT
Start: 2024-02-12 | End: 2024-02-21 | Stop reason: HOSPADM

## 2024-02-12 RX ADMIN — ATORVASTATIN CALCIUM 40 MG: 40 TABLET, FILM COATED ORAL at 11:02

## 2024-02-12 RX ADMIN — ASPIRIN 81 MG: 81 TABLET, COATED ORAL at 09:02

## 2024-02-12 RX ADMIN — ENOXAPARIN SODIUM 40 MG: 100 INJECTION SUBCUTANEOUS at 05:02

## 2024-02-12 RX ADMIN — Medication 6 MG: at 11:02

## 2024-02-12 RX ADMIN — AMLODIPINE BESYLATE 10 MG: 5 TABLET ORAL at 09:02

## 2024-02-12 RX ADMIN — SODIUM CHLORIDE: 9 INJECTION, SOLUTION INTRAVENOUS at 06:02

## 2024-02-12 RX ADMIN — CEFTRIAXONE 1 G: 1 INJECTION, POWDER, FOR SOLUTION INTRAMUSCULAR; INTRAVENOUS at 05:02

## 2024-02-12 RX ADMIN — POLYETHYLENE GLYCOL 3350 17 G: 17 POWDER, FOR SOLUTION ORAL at 11:02

## 2024-02-12 RX ADMIN — INSULIN ASPART 4 UNITS: 100 INJECTION, SOLUTION INTRAVENOUS; SUBCUTANEOUS at 11:02

## 2024-02-12 NOTE — PLAN OF CARE
Problem: Adult Inpatient Plan of Care  Goal: Plan of Care Review  Outcome: Ongoing, Progressing  Goal: Patient-Specific Goal (Individualized)  Outcome: Ongoing, Progressing  Goal: Absence of Hospital-Acquired Illness or Injury  Outcome: Ongoing, Progressing  Goal: Optimal Comfort and Wellbeing  Outcome: Ongoing, Progressing  Goal: Readiness for Transition of Care  Outcome: Ongoing, Progressing     Problem: Diabetes Comorbidity  Goal: Blood Glucose Level Within Targeted Range  Outcome: Ongoing, Progressing     Problem: Pain Acute  Goal: Acceptable Pain Control and Functional Ability  Outcome: Ongoing, Progressing     Problem: Fall Injury Risk  Goal: Absence of Fall and Fall-Related Injury  Outcome: Ongoing, Progressing     Problem: Infection  Goal: Absence of Infection Signs and Symptoms  Outcome: Ongoing, Progressing     Problem: UTI (Urinary Tract Infection)  Goal: Improved Infection Symptoms  Outcome: Ongoing, Progressing

## 2024-02-12 NOTE — PLAN OF CARE
02/12/24 1231   Discharge Assessment   Assessment Type Discharge Planning Assessment   Confirmed/corrected address, phone number and insurance Yes   Confirmed Demographics Correct on Facesheet   Source of Information patient   Does patient/caregiver understand observation status   (inpatient)   Communicated JAMAR with patient/caregiver Yes   Reason For Admission uncontrolled type 2 DM   People in Home alone   Facility Arrived From: home   Do you expect to return to your current living situation? Yes   Do you have help at home or someone to help you manage your care at home? No   Prior to hospitilization cognitive status: Unable to Assess   Current cognitive status: Alert/Oriented   Walking or Climbing Stairs Difficulty yes   Mobility Management uses RW   Equipment Currently Used at Home walker, rolling   Readmission within 30 days? No   Patient currently being followed by outpatient case management? No   Do you currently have service(s) that help you manage your care at home? No   Do you take prescription medications? Yes   Do you have prescription coverage? Yes   Coverage medicare A & B   Who is going to help you get home at discharge? transportation   How do you get to doctors appointments? family or friend will provide   Are you on dialysis? No   Discharge Plan A Home   Discharge Plan B Home   DME Needed Upon Discharge  none   Discharge Plan discussed with: Patient   Transition of Care Barriers None     Disch home when stable. Planned for tomorrow

## 2024-02-12 NOTE — PLAN OF CARE
Problem: Occupational Therapy  Goal: Occupational Therapy Goal  Description: LTG: Pt will perform basic ADLs and ADL transfers with Modified independence using LRAD by discharge.    STG: to be met by 3/11/2024    Pt will complete grooming standing at sink with LRAD with SBA.  Pt will complete UB dressing with SBA.  Pt will complete LB dressing with SBA using LRAD.  Pt will complete toileting with SBA using LRAD.  Pt will complete functional mobility to/from toilet and toilet transfer with SBA using LRAD.    Outcome: Ongoing, Progressing

## 2024-02-12 NOTE — PT/OT/SLP EVAL
"Occupational Therapy  Evaluation    Name: Silvano Perry  MRN: [id number]  Admitting Diagnosis: fall, uncontrolled T2DM  Recent Surgery: * No surgery found *      Recommendations:     Discharge therapy intensity: Low Intensity Therapy   Discharge Equipment Recommendations:  grab bar  Barriers to discharge:  Decreased caregiver support    Assessment:     Silvano Perry is a 68 y.o. male with a medical diagnosis of fall, uncontrolled T2DM.  He presents with the following performance deficits affecting function: weakness, impaired endurance, impaired self care skills, impaired functional mobility, pain, gait instability.     Patient able to perform bed mobility and functional transfer with MIN A. He is limited by rib pain at this time to move from seated to standing position. Patient would be appropriate for low-intensity therapy at this time.     Rehab Prognosis: Good; patient would benefit from acute skilled OT services to address these deficits and reach maximum level of function.       Plan:     Patient to be seen 4 x/week to address the above listed problems via self-care/home management, therapeutic activities, therapeutic exercises  Plan of Care Expires: 03/11/24  Plan of Care Reviewed with: patient    Subjective     Chief Complaint: "My ribs are hurting so bad where I fell. That's why it's hard to stand up."  Patient/Family Comments/goals: To return to PLOF.    Occupational Profile:  Living Environment: Pt lives in 1SH with 0STE. He lives alone.   Previous level of function: Pt reports being independent with all ADLs prior to admittance. Pt reports that T2DM medication noncompliance is by choice saying that he read the medications will give him "cancer."   Roles and Routines: Not working at this time.   Equipment Used at Home: walker, rolling, bedside commode, bath bench  Assistance upon Discharge: Pt will not have assistance upon discharge.     Pain/Comfort:  Pain Rating 1:  (Pt did not rate pain but demonstrated " pain behaviors during mobilty like groaning and extended time on (L) flank.)  Location - Side 1: Left  Location 1: rib(s)  Pain Addressed 1: Distraction    Patients cultural, spiritual, Anabaptist conflicts given the current situation: no    Objective:     OT communicated with RN prior to session.      Patient was found supine with PureWick (male external catheter) upon OT entry to room.    General Precautions: Standard, fall  Orthopedic Precautions: N/A  Braces: N/A    Vital Signs: Respiratory Status: on room air    Bed Mobility:    Patient completed Supine to Sit with contact guard assistance    Functional Mobility/Transfers:  Patient completed Sit <> Stand Transfer with moderate assistance  with  rolling walker   Patient completed Bed <> Chair Transfer using Step Transfer technique with minimum assistance with rolling walker  Patient completed Toilet Transfer Step Transfer technique with contact guard assistance with  rolling walker  Functional Mobility: Pt required frequent verbal cueing for safety during functional mobility with RW.     Activities of Daily Living:  Toileting: supervision      Functional Cognition:  Intact    Visual Perceptual Skills:  Intact  Reports wearing glasses    Upper Extremity Function:  Right Upper Extremity:   WFL    Left Upper Extremity:  WFL    Balance:   Fair    Therapeutic Positioning  Risk for acquired pressure injuries is increased due to relative decrease in mobility d/t hospitalization .    OT interventions performed during the course of today's session:   Education was provided on benefits of and recommendations for therapeutic positioning    Skin assessment: full body skin assessment was performed    Findings: no redness or breakdown noted    OT recommendations for therapeutic positioning throughout hospitalization:   Follow [facility] Pressure Injury Prevention Protocol    Patient Education:  Patient provided with verbal education education regarding OT role/goals/POC.   Understanding was verbalized.     Patient left up in chair with all lines intact    GOALS:   Multidisciplinary Problems    LTG: Pt will perform basic ADLs and ADL transfers with Modified independence using LRAD by discharge.    STG: to be met by 3/11/2024    Pt will complete grooming standing at sink with LRAD with SBA.  Pt will complete UB dressing with SBA.  Pt will complete LB dressing with SBA using LRAD.  Pt will complete toileting with SBA using LRAD.  Pt will complete functional mobility to/from toilet and toilet transfer with SBA using LRAD.     Outcome: Ongoing, Progressing         History:     Past Medical History:   Diagnosis Date    Diabetes mellitus          Past Surgical History:   Procedure Laterality Date    Bilateral great toe amputations         Time Tracking:     OT Date of Treatment:    OT Start Time: 0830  OT Stop Time: 0915  OT Total Time (min): 45 min    Billable Minutes:Evaluation moderate complexity    2/12/2024

## 2024-02-12 NOTE — PLAN OF CARE
Problem: Adult Inpatient Plan of Care  Goal: Plan of Care Review  Outcome: Ongoing, Progressing  Goal: Patient-Specific Goal (Individualized)  Outcome: Ongoing, Progressing  Goal: Absence of Hospital-Acquired Illness or Injury  Outcome: Ongoing, Progressing  Goal: Optimal Comfort and Wellbeing  Outcome: Ongoing, Progressing  Goal: Readiness for Transition of Care  Outcome: Ongoing, Progressing     Problem: Diabetes Comorbidity  Goal: Blood Glucose Level Within Targeted Range  Outcome: Ongoing, Progressing  Intervention: Monitor and Manage Glycemia  Flowsheets (Taken 2/11/2024 1818)  Glycemic Management: blood glucose monitored     Problem: Pain Acute  Goal: Acceptable Pain Control and Functional Ability  Outcome: Ongoing, Progressing  Intervention: Develop Pain Management Plan  Flowsheets (Taken 2/11/2024 1818)  Pain Management Interventions:   medication offered   position adjusted  Intervention: Prevent or Manage Pain  Flowsheets (Taken 2/11/2024 1818)  Medication Review/Management: medications reviewed     Problem: Fall Injury Risk  Goal: Absence of Fall and Fall-Related Injury  Outcome: Ongoing, Progressing  Intervention: Identify and Manage Contributors  Flowsheets (Taken 2/11/2024 1818)  Self-Care Promotion: independence encouraged  Medication Review/Management: medications reviewed  Intervention: Promote Injury-Free Environment  Flowsheets (Taken 2/11/2024 1818)  Safety Promotion/Fall Prevention:   assistive device/personal item within reach   nonskid shoes/socks when out of bed   side rails raised x 2   instructed to call staff for mobility     Problem: Infection  Goal: Absence of Infection Signs and Symptoms  Outcome: Ongoing, Progressing     Problem: UTI (Urinary Tract Infection)  Goal: Improved Infection Symptoms  Outcome: Ongoing, Progressing

## 2024-02-12 NOTE — PROGRESS NOTES
[facility]  Hospital Medicine Progress Note        Chief Complaint: Inpatient Follow-up      HPI:    68 year old male who PMH includes DM type II with prior DM foot wound several years ago; presents to the ED at [facility] via EMS s/p fall at [employer]. Pt reports he tripped on his feet, landed on his left side, he does report hitting his head. Denies any LOC, headaches, neck pain, back pain, N/V or dizziness. Pt does endorse left rib, side and abdominal pain. Pt has left elbow abrasion from the fall.  Patient reports he ambulates without assistance however does have some weakness to his lower legs which is chronic.  No reported chest pain, shortness and breath, fever, chills, cough, congestion, or any sick contacts.  Lab work reviewed demonstrated WBCs 14.07, potassium 5.2, BUN 18.7, creatinine 1.38, glucose 444; alkaline phosphatase 164, AST 30, ALT 56, hemoglobin A1c 11.6; other indices unremarkable.  Urinalysis done suggestive of acute UTI.  CTA of the abdomen and pelvis with contrast impression reviewed demonstrated no posttraumatic abnormality of the abdomen and pelvis, hazy inflammatory stranding in the mesenteric root which is nonspecific but can be seen in the setting of mesenteric adenitis.  Patient was transferred from [facility] ED and admitted to [facility] on 2/10/2024.   Initial vital signs /74 pulse 82 respirations 20 temperature 98.3° F O2 saturation 98% on room air.  Patient received IV hydration, sectioned and regular insulin IV push, and started on IV ceftriaxone therapy at outlying ED. patient repeat glucose trended down to 260.  Patient is admitted to hospital medicine services for further management.        He was admitted after a fall with near syncope and sustained bruises to his ribs.  He is found to have acute UTI and started on IV antibiotics.  Urine cultures are pending     Interval Hx:   Patient seen and examined at bedside, no family member at bedside seated in a  recliner chair   No overnight events reported   Vitals reviewed and blood pressure low normal, heart rate within normal limits and saturating adequately on room air   Leukocytosis has resolved, hemoglobin of 11.6, blood glucose of 129   Echo reviewed with EF of 60-65% grade 1 diastolic dysfunction   Carotid ultrasound with 50-69% bilateral carotid artery disease   CT head negative   Urine cultures negative till date at 12:00 p.m.        Objective/physical exam:  General: In no acute distress, afebrile  Chest:  Unlabored breathing on room air   Heart: +S1, S2 normal rate  Abdomen: Soft  MSK: Warm, no lower extremity edema  Neurologic: Alert and oriented moving all extremities spontaneous      Assessment/Plan:  Acute UTI  Carotid ultrasound with 50-69% bilateral carotid artery disease   Uncontrolled hypertension, now low normal  Ground level fall, suspecting Near-syncope   Uncontrolled T2DM with hyperglycemia, A1c 11.6  Depression   Constipation    Plan   Continue IV ceftriaxone day 2 of 3, follow urine cultures till complete   Begin aspirin 81 mg daily and p.o. atorvastatin 40 mg at bedtime given carotid artery disease   Patient will need a vascular surgery outpatient follow-up for monitoring, can follow with PCP as well   Blood pressure is now running a little low, discontinue losartan and continue amlodipine 10 mg daily   Pain meds as needed for rib pain  Patient with uncontrolled diabetes A1c 11.6%.  Detemir 20 units nightly and monitor blood sugars and cover with sliding scale a.c. HS   Add on stool softeners  PT recommends low intensity  Case management/social work consult for PCP referral and insurance issues  Care discussed with patient's nurse          VTE prophylaxis:  Lovenox     Patient condition:  Fair     Anticipated discharge and Disposition:   Likely discharge to home in the next 24 hours          VITAL SIGNS: 24 HRS MIN & MAX LAST   Temp  Min: 97.6 °F (36.4 °C)  Max: 98.3 °F (36.8 °C) 98 °F (36.7 °C)    BP  Min: 106/67  Max: 155/83 119/62   Pulse  Min: 63  Max: 73  63   Resp  Min: 18  Max: 18 18   SpO2  Min: 95 %  Max: 100 % 99 %     I have reviewed the following labs:  Recent Labs   Lab 02/10/24  1633 02/11/24  0432 02/12/24  0631   WBC 14.07* 12.06* 7.79   RBC 5.38 4.72 4.19*   HGB 14.8 13.0* 11.6*   HCT 45.4 40.2* 35.7*   MCV 84.4 85.2 85.2   MCH 27.5 27.5 27.7   MCHC 32.6* 32.3* 32.5*   RDW 13.2 13.5 13.8    166 145   MPV 12.3* 11.8* 12.1*     Recent Labs   Lab 02/10/24  1633 02/11/24  0432 02/12/24  0631    136 137   K 5.2* 3.8 3.7   CO2 25 21* 20*   BUN 18.7 19.2 13.3   CREATININE 1.38* 0.83 0.66*   CALCIUM 9.9 8.5* 7.7*   MG  --  1.90  --    ALBUMIN 3.8 3.1*  --    ALKPHOS 164* 126  --    ALT 56* 43  --    AST 30 24  --    BILITOT 0.5 0.4  --      Microbiology Results (last 7 days)       Procedure Component Value Units Date/Time    Urine culture [[id number]] Collected: 02/10/24 1745    Order Status: Completed Specimen: Urine Updated: 02/11/24 0629     Urine Culture No Growth At 24 Hours             See below for Radiology    Scheduled Med:   amLODIPine  10 mg Oral Daily    aspirin  81 mg Oral Daily    atorvastatin  40 mg Oral QHS    cefTRIAXone (Rocephin) IV (PEDS and ADULTS)  1 g Intravenous Q24H    enoxparin  40 mg Subcutaneous Q24H (prophylaxis, 1700)    insulin detemir U-100  20 Units Subcutaneous QHS      Continuous Infusions:     PRN Meds:  acetaminophen, aluminum-magnesium hydroxide-simethicone, cloNIDine, dextrose 10%, dextrose 10%, glucagon (human recombinant), glucose, glucose, hydrALAZINE, HYDROcodone-acetaminophen, insulin aspart U-100, labetalol, melatonin, naloxone, ondansetron, sodium chloride 0.9%     Assessment/Plan:      VTE prophylaxis:     Patient condition:  Stable/Fair/Guarded/ Serious/ Critical    Anticipated discharge and Disposition:         All diagnosis and differential diagnosis have been reviewed; assessment and plan has been documented; I have personally reviewed  the labs and test results that are presently available; I have reviewed the patients medication list; I have reviewed the consulting providers response and recommendations. I have reviewed or attempted to review medical records based upon their availability    All of the patient's questions have been  addressed and answered. Patient's is agreeable to the above stated plan. I will continue to monitor closely and make adjustments to medical management as needed.  _____________________________________________________________________    Nutrition Status:    Radiology:  I have personally reviewed the following imaging and agree with the radiologist.     CV Ultrasound Bilateral Doppler Carotid  The right internal carotid artery demonstrated 50-69% stenosis.   The left internal carotid artery demonstrated 50-69% stenosis.   Bilateral vertebral arteries were patent with antegrade flow.       Jose Leal MD   02/12/2024

## 2024-02-13 LAB
CREAT UR-MCNC: 90.5 MG/DL (ref 63–166)
POCT GLUCOSE: 166 MG/DL (ref 70–110)
POCT GLUCOSE: 182 MG/DL (ref 70–110)
POCT GLUCOSE: 205 MG/DL (ref 70–110)
POCT GLUCOSE: 227 MG/DL (ref 70–110)
PROT UR STRIP-MCNC: 67.6 MG/DL
SODIUM UR-SCNC: 93 MMOL/L
URINE PROTEIN/CREATININE RATIO (OLG): 0.7

## 2024-02-13 PROCEDURE — 63600175 PHARM REV CODE 636 W HCPCS: Performed by: INTERNAL MEDICINE

## 2024-02-13 PROCEDURE — 21400001 HC TELEMETRY ROOM

## 2024-02-13 PROCEDURE — 82570 ASSAY OF URINE CREATININE: CPT | Performed by: INTERNAL MEDICINE

## 2024-02-13 PROCEDURE — 25000003 PHARM REV CODE 250: Performed by: INTERNAL MEDICINE

## 2024-02-13 PROCEDURE — 11000001 HC ACUTE MED/SURG PRIVATE ROOM

## 2024-02-13 PROCEDURE — 97116 GAIT TRAINING THERAPY: CPT | Mod: CQ

## 2024-02-13 PROCEDURE — 94761 N-INVAS EAR/PLS OXIMETRY MLT: CPT

## 2024-02-13 PROCEDURE — 84300 ASSAY OF URINE SODIUM: CPT | Performed by: INTERNAL MEDICINE

## 2024-02-13 RX ORDER — LACTULOSE 10 G/15ML
30 SOLUTION ORAL ONCE
Status: COMPLETED | OUTPATIENT
Start: 2024-02-13 | End: 2024-02-13

## 2024-02-13 RX ADMIN — INSULIN ASPART 2 UNITS: 100 INJECTION, SOLUTION INTRAVENOUS; SUBCUTANEOUS at 05:02

## 2024-02-13 RX ADMIN — POLYETHYLENE GLYCOL 3350 17 G: 17 POWDER, FOR SOLUTION ORAL at 09:02

## 2024-02-13 RX ADMIN — LACTULOSE 30 G: 10 SOLUTION ORAL at 03:02

## 2024-02-13 RX ADMIN — ENOXAPARIN SODIUM 40 MG: 100 INJECTION SUBCUTANEOUS at 05:02

## 2024-02-13 RX ADMIN — ATORVASTATIN CALCIUM 40 MG: 40 TABLET, FILM COATED ORAL at 09:02

## 2024-02-13 RX ADMIN — ASPIRIN 81 MG: 81 TABLET, COATED ORAL at 08:02

## 2024-02-13 RX ADMIN — AMLODIPINE BESYLATE 10 MG: 5 TABLET ORAL at 08:02

## 2024-02-13 RX ADMIN — INSULIN ASPART 6 UNITS: 100 INJECTION, SOLUTION INTRAVENOUS; SUBCUTANEOUS at 12:02

## 2024-02-13 RX ADMIN — INSULIN DETEMIR 20 UNITS: 100 INJECTION, SOLUTION SUBCUTANEOUS at 10:02

## 2024-02-13 RX ADMIN — VANCOMYCIN HYDROCHLORIDE 1500 MG: 1.5 INJECTION, POWDER, LYOPHILIZED, FOR SOLUTION INTRAVENOUS at 04:02

## 2024-02-13 RX ADMIN — POLYETHYLENE GLYCOL 3350 17 G: 17 POWDER, FOR SOLUTION ORAL at 08:02

## 2024-02-13 RX ADMIN — INSULIN ASPART 2 UNITS: 100 INJECTION, SOLUTION INTRAVENOUS; SUBCUTANEOUS at 12:02

## 2024-02-13 RX ADMIN — INSULIN DETEMIR 20 UNITS: 100 INJECTION, SOLUTION SUBCUTANEOUS at 12:02

## 2024-02-13 NOTE — PROGRESS NOTES
Pharmacokinetic Initial Assessment: IV Vancomycin    Assessment/Plan:    Initiate intravenous vancomycin with loading dose of 1500 mg once followed by a maintenance dose of vancomycin 1250mg IV every 12 hours  Desired empiric serum trough concentration is 15 to 20 mcg/mL  Draw vancomycin trough level 60 min prior to fourth dose on 2/15 at approximately 0400  Pharmacy will continue to follow and monitor vancomycin.      Please contact pharmacy at extension [phone] with any questions regarding this assessment.     Thank you for the consult,   Nicholas Destini       Patient brief summary:  Silvano Perry is a 68 y.o. male initiated on antimicrobial therapy with IV Vancomycin for treatment of suspected urinary tract infection    Drug Allergies:   Review of patient's allergies indicates:  No Known Allergies    Actual Body Weight:   73.3 kg    Renal Function:   Estimated Creatinine Clearance: 113.7 mL/min (A) (based on SCr of 0.66 mg/dL (L)).,     Dialysis Method (if applicable):  N/A    CBC (last 72 hours):  Recent Labs   Lab Result Units 02/10/24  1745 02/11/24  0432 02/12/24  0631   WBC x10(3)/mcL  --  12.06* 7.79   Hgb g/dL  --  13.0* 11.6*   Hemoglobin A1c % 11.6*  --   --    Hct %  --  40.2* 35.7*   Platelet x10(3)/mcL  --  166 145   Mono % %  --  8.8 9.0   Eos % %  --  0.6 1.9   Basophil % %  --  0.7 0.5       Metabolic Panel (last 72 hours):  Recent Labs   Lab Result Units 02/10/24  1745 02/11/24  0432 02/12/24  0631 02/13/24  0617   Sodium Level mmol/L  --  136 137  --    Urine Sodium mmol/L  --   --   --  93.0   Potassium Level mmol/L  --  3.8 3.7  --    Chloride mmol/L  --  108* 113*  --    Carbon Dioxide mmol/L  --  21* 20*  --    Glucose Level mg/dL  --  217* 122*  --    Glucose, UA  >=1000*  --   --   --    Blood Urea Nitrogen mg/dL  --  19.2 13.3  --    Creatinine mg/dL  --  0.83 0.66*  --    Urine Creatinine mg/dL  --   --   --  90.5   Albumin Level g/dL  --  3.1*  --   --    Bilirubin Total mg/dL  --  0.4  --    "--    Alkaline Phosphatase unit/L  --  126  --   --    Aspartate Aminotransferase unit/L  --  24  --   --    Alanine Aminotransferase unit/L  --  43  --   --    Magnesium Level mg/dL  --  1.90  --   --    Phosphorus Level mg/dL  --  2.0*  --   --        Drug levels (last 3 results):  No results for input(s): "VANCOMYCINRA", "VANCORANDOM", "VANCOMYCINPE", "VANCOPEAK", "VANCOMYCINTR", "VANCOTROUGH" in the last 72 hours.    Microbiologic Results:  Microbiology Results (last 7 days)       Procedure Component Value Units Date/Time    Urine culture [[id number]]  (Abnormal) Collected: 02/10/24 3485    Order Status: Completed Specimen: Urine Updated: 02/12/24 1122     Urine Culture >/= 100,000 colonies/ml Enterococcus faecalis            "

## 2024-02-13 NOTE — PROGRESS NOTES
[facility]  Hospital Medicine Progress Note        Chief Complaint: Inpatient Follow-up      HPI:    68 year old male who PMH includes DM type II with prior DM foot wound several years ago; presents to the ED at [facility] via EMS s/p fall at [employer]. Pt reports he tripped on his feet, landed on his left side, he does report hitting his head. Denies any LOC, headaches, neck pain, back pain, N/V or dizziness. Pt does endorse left rib, side and abdominal pain. Pt has left elbow abrasion from the fall.  Patient reports he ambulates without assistance however does have some weakness to his lower legs which is chronic.  No reported chest pain, shortness and breath, fever, chills, cough, congestion, or any sick contacts.  Lab work reviewed demonstrated WBCs 14.07, potassium 5.2, BUN 18.7, creatinine 1.38, glucose 444; alkaline phosphatase 164, AST 30, ALT 56, hemoglobin A1c 11.6; other indices unremarkable.  Urinalysis done suggestive of acute UTI.  CTA of the abdomen and pelvis with contrast impression reviewed demonstrated no posttraumatic abnormality of the abdomen and pelvis, hazy inflammatory stranding in the mesenteric root which is nonspecific but can be seen in the setting of mesenteric adenitis.  Patient was transferred from [facility] ED and admitted to [facility] on 2/10/2024.   Initial vital signs /74 pulse 82 respirations 20 temperature 98.3° F O2 saturation 98% on room air.  Patient received IV hydration, sectioned and regular insulin IV push, and started on IV ceftriaxone therapy at outlying ED. patient repeat glucose trended down to 260.  Patient is admitted to hospital medicine services for further management.        He was admitted after a fall with near syncope and sustained bruises to his ribs.  He is found to have acute UTI and started on IV antibiotics.  Urine cultures are pending      Interval Hx:   Patient seen and examined at bedside, no family member at bedside   No  overnight events reported   Patient complains of continued constipation still unable to have a BM   Vitals reviewed and stable on room air   Urine cultures growing E faecalis will adjust antibiotics to vancomycin      Case discussed with nursing team and   Objective/physical exam:  General: In no acute distress, afebrile  Chest:  Unlabored breathing on room air   Heart: +S1, S2 normal rate  Abdomen: Soft  MSK: Warm, no lower extremity edema  Neurologic: Alert and oriented moving all extremities spontaneous        Assessment/Plan:  Acute UTI- e fecalis   Carotid ultrasound with 50-69% bilateral carotid artery disease   Uncontrolled hypertension, now low normal  Ground level fall, suspecting Near-syncope   Uncontrolled T2DM with hyperglycemia, A1c 11.6  Depression   Constipation     Plan   Vitals reviewed and stable on room air   Urine cultures growing E faecalis will adjust antibiotics to vancomycin   Consult pharmacy to dose vancomycin, day 1   Discontinue IV ceftriaxone  Follow-up urine cultures for sensitivity   Intensify laxatives add a dose of p.o. lactulose 20 g x 1  aspirin 81 mg daily and p.o. atorvastatin 40 mg at bedtime given carotid artery disease   Patient will need a vascular surgery outpatient follow-up for monitoring, can follow with PCP as well   Blood pressure is now running a little low, discontinue losartan and continue amlodipine 10 mg daily   Pain meds as needed for rib pain  Patient with uncontrolled diabetes A1c 11.6%.  Detemir 20 units nightly and monitor blood sugars and cover with sliding scale a.c. HS   Add on stool softeners  PT recommends moderate intensity , will inform cm   Case management/social work consult for PCP referral and insurance issues  Care discussed with patient's nurse           VTE prophylaxis:  Lovenox     Patient condition:  Fair     Anticipated discharge and Disposition:   Needs acute rehab, pending urine sensitivities         VITAL SIGNS: 24 HRS MIN & MAX  LAST   Temp  Min: 98 °F (36.7 °C)  Max: 99.5 °F (37.5 °C) 99.5 °F (37.5 °C)   BP  Min: 92/67  Max: 163/88 (!) 154/86   Pulse  Min: 72  Max: 90  89   Resp  Min: 17  Max: 20 17   SpO2  Min: 95 %  Max: 96 % 96 %     I have reviewed the following labs:  Recent Labs   Lab 02/10/24  1633 02/11/24  0432 02/12/24  0631   WBC 14.07* 12.06* 7.79   RBC 5.38 4.72 4.19*   HGB 14.8 13.0* 11.6*   HCT 45.4 40.2* 35.7*   MCV 84.4 85.2 85.2   MCH 27.5 27.5 27.7   MCHC 32.6* 32.3* 32.5*   RDW 13.2 13.5 13.8    166 145   MPV 12.3* 11.8* 12.1*     Recent Labs   Lab 02/10/24  1633 02/11/24  0432 02/12/24  0631    136 137   K 5.2* 3.8 3.7   CO2 25 21* 20*   BUN 18.7 19.2 13.3   CREATININE 1.38* 0.83 0.66*   CALCIUM 9.9 8.5* 7.7*   MG  --  1.90  --    ALBUMIN 3.8 3.1*  --    ALKPHOS 164* 126  --    ALT 56* 43  --    AST 30 24  --    BILITOT 0.5 0.4  --      Microbiology Results (last 7 days)       Procedure Component Value Units Date/Time    Urine culture [[id number]]  (Abnormal) Collected: 02/10/24 1745    Order Status: Completed Specimen: Urine Updated: 02/12/24 1122     Urine Culture >/= 100,000 colonies/ml Enterococcus faecalis             See below for Radiology    Scheduled Med:   amLODIPine  10 mg Oral Daily    aspirin  81 mg Oral Daily    atorvastatin  40 mg Oral QHS    enoxparin  40 mg Subcutaneous Q24H (prophylaxis, 1700)    insulin detemir U-100  20 Units Subcutaneous QHS    polyethylene glycol  17 g Oral BID      Continuous Infusions:     PRN Meds:  acetaminophen, aluminum-magnesium hydroxide-simethicone, cloNIDine, dextrose 10%, dextrose 10%, glucagon (human recombinant), glucose, glucose, hydrALAZINE, HYDROcodone-acetaminophen, insulin aspart U-100, labetalol, melatonin, naloxone, ondansetron, sodium chloride 0.9%     Assessment/Plan:      VTE prophylaxis:     Patient condition:  Stable/Fair/Guarded/ Serious/ Critical    Anticipated discharge and Disposition:         All diagnosis and differential diagnosis have  been reviewed; assessment and plan has been documented; I have personally reviewed the labs and test results that are presently available; I have reviewed the patients medication list; I have reviewed the consulting providers response and recommendations. I have reviewed or attempted to review medical records based upon their availability    All of the patient's questions have been  addressed and answered. Patient's is agreeable to the above stated plan. I will continue to monitor closely and make adjustments to medical management as needed.  _____________________________________________________________________    Nutrition Status:    Radiology:  I have personally reviewed the following imaging and agree with the radiologist.     CV Ultrasound Bilateral Doppler Carotid  The right internal carotid artery demonstrated 50-69% stenosis.   The left internal carotid artery demonstrated 50-69% stenosis.   Bilateral vertebral arteries were patent with antegrade flow.       Jose Leal MD   02/13/2024

## 2024-02-13 NOTE — PT/OT/SLP PROGRESS
Physical Therapy Treatment    Patient Name:  Silvano Perry   MRN:  [id number]    Recommendations:     Discharge therapy intensity: Moderate Intensity Therapy   Discharge Equipment Recommendations: none  Barriers to discharge: Decreased caregiver support and Impaired mobility    Assessment:     Silvano Perry is a 68 y.o. male admitted with a medical diagnosis of T2DM.  He presents with the following impairments/functional limitations: weakness, impaired endurance, gait instability, impaired balance, decreased safety awareness, pain .    Pt currently modA to stand from chair and toilet. Pt states he has no one to assist him at home. Rec placement at this time unless pt progresses during hospital stay. NSG aware.     Rehab Prognosis: Good; patient would benefit from acute skilled PT services to address these deficits and reach maximum level of function.    Recent Surgery: * No surgery found *      Plan:     During this hospitalization, patient to be seen 5 x/week to address the identified rehab impairments via gait training, therapeutic activities, therapeutic exercises, neuromuscular re-education and progress toward the following goals:    Plan of Care Expires:  03/11/24    Subjective     Chief Complaint:   Patient/Family Comments/goals:   Pain/Comfort:  Pain Rating 1: 0/10      Objective:     Communicated with NSG prior to session.  Patient found up in chair with   upon PT entry to room.     General Precautions: Standard, fall  Orthopedic Precautions: N/A  Braces: N/A  Respiratory Status: Room air  Blood Pressure:   Skin Integrity: Visible skin intact      Functional Mobility:  Transfers:     Sit to Stand:  moderate assistance with rolling walker and 1 trial from bedside chair   Toilet Transfer: minimum assistance with  rolling walker  using  Step Transfer and pt with one minor LOB posteriorly and was [place] to correct. Pt unable to have BM   Gait: pt amb 10ft/62ft with RW CGA. Pt with step-to gait pattern and unsteady  gait. No major LOB.       Patient left up in chair with call button in reach..    GOALS:   Multidisciplinary Problems       Physical Therapy Goals          Problem: Physical Therapy    Goal Priority Disciplines Outcome Goal Variances Interventions   Physical Therapy Goal     PT, PT/OT Ongoing, Progressing     Description: Goals to be met by: 3/11/24     Patient will increase functional independence with mobility by performin. Supine to sit with [place]  2. Sit to supine with [place]  3. Sit to stand transfer with Modified [place]  4. Gait  x 300 feet with Modified [place] using Rolling Walker.                          Time Tracking:     PT Received On: 24  PT Start Time: 1359     PT Stop Time: 1414  PT Total Time (min): 15 min     Billable Minutes: Gait Training 10 TherAct 5    Treatment Type: Treatment  PT/PTA: PTA     Number of PTA visits since last PT visit: 2024

## 2024-02-14 LAB
BACTERIA UR CULT: ABNORMAL
CREAT SERPL-MCNC: 0.72 MG/DL (ref 0.73–1.18)
GFR SERPLBLD CREATININE-BSD FMLA CKD-EPI: >60 MLS/MIN/1.73/M2
POCT GLUCOSE: 145 MG/DL (ref 70–110)
POCT GLUCOSE: 159 MG/DL (ref 70–110)
POCT GLUCOSE: 209 MG/DL (ref 70–110)
POCT GLUCOSE: 221 MG/DL (ref 70–110)

## 2024-02-14 PROCEDURE — 21400001 HC TELEMETRY ROOM

## 2024-02-14 PROCEDURE — 25000003 PHARM REV CODE 250: Performed by: EMERGENCY MEDICINE

## 2024-02-14 PROCEDURE — 25000003 PHARM REV CODE 250: Performed by: INTERNAL MEDICINE

## 2024-02-14 PROCEDURE — 63600175 PHARM REV CODE 636 W HCPCS: Performed by: INTERNAL MEDICINE

## 2024-02-14 PROCEDURE — 82565 ASSAY OF CREATININE: CPT | Performed by: INTERNAL MEDICINE

## 2024-02-14 PROCEDURE — 11000001 HC ACUTE MED/SURG PRIVATE ROOM

## 2024-02-14 PROCEDURE — 94761 N-INVAS EAR/PLS OXIMETRY MLT: CPT

## 2024-02-14 RX ORDER — LACTULOSE 10 G/15ML
30 SOLUTION ORAL 2 TIMES DAILY
Status: DISCONTINUED | OUTPATIENT
Start: 2024-02-14 | End: 2024-02-21 | Stop reason: HOSPADM

## 2024-02-14 RX ADMIN — Medication 6 MG: at 08:02

## 2024-02-14 RX ADMIN — POLYETHYLENE GLYCOL 3350 17 G: 17 POWDER, FOR SOLUTION ORAL at 08:02

## 2024-02-14 RX ADMIN — LACTULOSE 30 G: 10 SOLUTION ORAL at 05:02

## 2024-02-14 RX ADMIN — LACTULOSE 30 G: 10 SOLUTION ORAL at 08:02

## 2024-02-14 RX ADMIN — INSULIN DETEMIR 20 UNITS: 100 INJECTION, SOLUTION SUBCUTANEOUS at 09:02

## 2024-02-14 RX ADMIN — ENOXAPARIN SODIUM 40 MG: 100 INJECTION SUBCUTANEOUS at 04:02

## 2024-02-14 RX ADMIN — POLYETHYLENE GLYCOL 3350 17 G: 17 POWDER, FOR SOLUTION ORAL at 09:02

## 2024-02-14 RX ADMIN — ATORVASTATIN CALCIUM 40 MG: 40 TABLET, FILM COATED ORAL at 08:02

## 2024-02-14 RX ADMIN — INSULIN ASPART 4 UNITS: 100 INJECTION, SOLUTION INTRAVENOUS; SUBCUTANEOUS at 05:02

## 2024-02-14 RX ADMIN — AMLODIPINE BESYLATE 10 MG: 5 TABLET ORAL at 09:02

## 2024-02-14 RX ADMIN — ASPIRIN 81 MG: 81 TABLET, COATED ORAL at 09:02

## 2024-02-14 RX ADMIN — INSULIN ASPART 4 UNITS: 100 INJECTION, SOLUTION INTRAVENOUS; SUBCUTANEOUS at 09:02

## 2024-02-14 RX ADMIN — CLONIDINE HYDROCHLORIDE 0.2 MG: 0.2 TABLET ORAL at 12:02

## 2024-02-14 RX ADMIN — VANCOMYCIN HYDROCHLORIDE 1250 MG: 1.25 INJECTION, POWDER, LYOPHILIZED, FOR SOLUTION INTRAVENOUS at 04:02

## 2024-02-14 NOTE — PLAN OF CARE
Spoke to pt. Regarding rehab, [facility] is ist choice, 2nd choice is [facility] rehab. Referral sent to [facility] Ashlee here to n[place]. This pt.

## 2024-02-14 NOTE — PROGRESS NOTES
[facility]  Hospital Medicine Progress Note        Chief Complaint: Inpatient Follow-up      HPI:    68 year old male who PMH includes DM type II with prior DM foot wound several years ago; presents to the ED at [facility] via EMS s/p fall at [employer]. Pt reports he tripped on his feet, landed on his left side, he does report hitting his head. Denies any LOC, headaches, neck pain, back pain, N/V or dizziness. Pt does endorse left rib, side and abdominal pain. Pt has left elbow abrasion from the fall.  Patient reports he ambulates without assistance however does have some weakness to his lower legs which is chronic.  No reported chest pain, shortness and breath, fever, chills, cough, congestion, or any sick contacts.  Lab work reviewed demonstrated WBCs 14.07, potassium 5.2, BUN 18.7, creatinine 1.38, glucose 444; alkaline phosphatase 164, AST 30, ALT 56, hemoglobin A1c 11.6; other indices unremarkable.  Urinalysis done suggestive of acute UTI.  CTA of the abdomen and pelvis with contrast impression reviewed demonstrated no posttraumatic abnormality of the abdomen and pelvis, hazy inflammatory stranding in the mesenteric root which is nonspecific but can be seen in the setting of mesenteric adenitis.  Patient was transferred from [facility] ED and admitted to [facility] on 2/10/2024.   Initial vital signs /74 pulse 82 respirations 20 temperature 98.3° F O2 saturation 98% on room air.  Patient received IV hydration, sectioned and regular insulin IV push, and started on IV ceftriaxone therapy at outlying ED. patient repeat glucose trended down to 260.  Patient is admitted to hospital medicine services for further management.        He was admitted after a fall with near syncope and sustained bruises to his ribs.  He is found to have acute UTI and started on IV antibiotics.  Urine cultures growing E faecalis will adjust antibiotics to vancomycin         Interval Hx:   Patient seen and examined at  bedside, no family member at bedside   No overnight events reported   Patient complains of continued constipation still unable to have a BM   Vitals reviewed and stable on room air     Case discussed with nursing team and       Objective/physical exam:  General: In no acute distress, afebrile  Chest:  Unlabored breathing on room air   Heart: +S1, S2 normal rate  Abdomen: Soft  MSK: Warm, no lower extremity edema  Neurologic: Alert and oriented moving all extremities spontaneous        Assessment/Plan:  Acute UTI- e fecalis   Carotid ultrasound with 50-69% bilateral carotid artery disease   Uncontrolled hypertension, now low normal  Ground level fall, suspecting Near-syncope   Uncontrolled T2DM with hyperglycemia, A1c 11.6  Depression   Constipation       Plan   Vitals reviewed and stable on room air   Urine cultures growing E faecalis will adjust antibiotics to vancomycin   Consult pharmacy to dose vancomycin, day 2   Follow-up urine cultures for sensitivity   Intensify laxatives add a dose of p.o. lactulose 30 g x tid  aspirin 81 mg daily and p.o. atorvastatin 40 mg at bedtime given carotid artery disease   Patient will need a vascular surgery outpatient follow-up for monitoring, can follow with PCP as well   Blood pressure is now running a little low, discontinue losartan and continue amlodipine 10 mg daily   Pain meds as needed for rib pain  Patient with uncontrolled diabetes A1c 11.6%.  Detemir 20 units nightly and monitor blood sugars and cover with sliding scale a.c. HS   Add on stool softeners  PT recommends moderate intensity , will inform cm   Case management/social work consult for PCP referral and insurance issues  Care discussed with patient's nurse           VTE prophylaxis:  Lovenox     Patient condition:  Fair     Anticipated discharge and Disposition:   Needs acute rehab, pending urine sensitivities       VITAL SIGNS: 24 HRS MIN & MAX LAST   Temp  Min: 97.4 °F (36.3 °C)  Max: 99 °F (37.2  °C) 98 °F (36.7 °C)   BP  Min: 114/69  Max: 168/81 (!) 168/81   Pulse  Min: 84  Max: 97  84   Resp  Min: 18  Max: 18 18   SpO2  Min: 96 %  Max: 98 % 96 %     I have reviewed the following labs:  Recent Labs   Lab 02/10/24  1633 02/11/24  0432 02/12/24  0631   WBC 14.07* 12.06* 7.79   RBC 5.38 4.72 4.19*   HGB 14.8 13.0* 11.6*   HCT 45.4 40.2* 35.7*   MCV 84.4 85.2 85.2   MCH 27.5 27.5 27.7   MCHC 32.6* 32.3* 32.5*   RDW 13.2 13.5 13.8    166 145   MPV 12.3* 11.8* 12.1*     Recent Labs   Lab 02/10/24  1633 02/11/24  0432 02/12/24  0631 02/14/24  0415    136 137  --    K 5.2* 3.8 3.7  --    CO2 25 21* 20*  --    BUN 18.7 19.2 13.3  --    CREATININE 1.38* 0.83 0.66* 0.72*   CALCIUM 9.9 8.5* 7.7*  --    MG  --  1.90  --   --    ALBUMIN 3.8 3.1*  --   --    ALKPHOS 164* 126  --   --    ALT 56* 43  --   --    AST 30 24  --   --    BILITOT 0.5 0.4  --   --      Microbiology Results (last 7 days)       Procedure Component Value Units Date/Time    Urine culture [[id number]]  (Abnormal)  (Susceptibility) Collected: 02/10/24 1745    Order Status: Completed Specimen: Urine Updated: 02/14/24 1105     Urine Culture >/= 100,000 colonies/ml Enterococcus faecalis             See below for Radiology    Scheduled Med:   amLODIPine  10 mg Oral Daily    aspirin  81 mg Oral Daily    atorvastatin  40 mg Oral QHS    enoxparin  40 mg Subcutaneous Q24H (prophylaxis, 1700)    insulin detemir U-100  20 Units Subcutaneous QHS    polyethylene glycol  17 g Oral BID    vancomycin (VANCOCIN) IV (PEDS and ADULTS)  1,250 mg Intravenous Q12H      Continuous Infusions:     PRN Meds:  acetaminophen, aluminum-magnesium hydroxide-simethicone, cloNIDine, dextrose 10%, dextrose 10%, glucagon (human recombinant), glucose, glucose, hydrALAZINE, HYDROcodone-acetaminophen, insulin aspart U-100, labetalol, melatonin, naloxone, ondansetron, sodium chloride 0.9%, vancomycin - pharmacy to dose     Assessment/Plan:      VTE prophylaxis:     Patient  condition:  Stable/Fair/Guarded/ Serious/ Critical    Anticipated discharge and Disposition:         All diagnosis and differential diagnosis have been reviewed; assessment and plan has been documented; I have personally reviewed the labs and test results that are presently available; I have reviewed the patients medication list; I have reviewed the consulting providers response and recommendations. I have reviewed or attempted to review medical records based upon their availability    All of the patient's questions have been  addressed and answered. Patient's is agreeable to the above stated plan. I will continue to monitor closely and make adjustments to medical management as needed.  _____________________________________________________________________    Nutrition Status:    Radiology:  I have personally reviewed the following imaging and agree with the radiologist.     CV Ultrasound Bilateral Doppler Carotid  The right internal carotid artery demonstrated 50-69% stenosis.   The left internal carotid artery demonstrated 50-69% stenosis.   Bilateral vertebral arteries were patent with antegrade flow.       Jose Leal MD   02/14/2024

## 2024-02-15 LAB
ANION GAP SERPL CALC-SCNC: 7 MEQ/L
BASOPHILS # BLD AUTO: 0.04 X10(3)/MCL
BASOPHILS NFR BLD AUTO: 0.3 %
BUN SERPL-MCNC: 16.8 MG/DL (ref 8.4–25.7)
CALCIUM SERPL-MCNC: 8.2 MG/DL (ref 8.8–10)
CHLORIDE SERPL-SCNC: 105 MMOL/L (ref 98–107)
CO2 SERPL-SCNC: 23 MMOL/L (ref 23–31)
CREAT SERPL-MCNC: 0.73 MG/DL (ref 0.73–1.18)
CREAT/UREA NIT SERPL: 23
EOSINOPHIL # BLD AUTO: 0.01 X10(3)/MCL (ref 0–0.9)
EOSINOPHIL NFR BLD AUTO: 0.1 %
ERYTHROCYTE [DISTWIDTH] IN BLOOD BY AUTOMATED COUNT: 13.9 % (ref 11.5–17)
GFR SERPLBLD CREATININE-BSD FMLA CKD-EPI: >60 MLS/MIN/1.73/M2
GLUCOSE SERPL-MCNC: 176 MG/DL (ref 82–115)
HCT VFR BLD AUTO: 37.1 % (ref 42–52)
HGB BLD-MCNC: 12.1 G/DL (ref 14–18)
IMM GRANULOCYTES # BLD AUTO: 0.04 X10(3)/MCL (ref 0–0.04)
IMM GRANULOCYTES NFR BLD AUTO: 0.3 %
LYMPHOCYTES # BLD AUTO: 0.95 X10(3)/MCL (ref 0.6–4.6)
LYMPHOCYTES NFR BLD AUTO: 6.7 %
MCH RBC QN AUTO: 27.8 PG (ref 27–31)
MCHC RBC AUTO-ENTMCNC: 32.6 G/DL (ref 33–36)
MCV RBC AUTO: 85.1 FL (ref 80–94)
MONOCYTES # BLD AUTO: 0.83 X10(3)/MCL (ref 0.1–1.3)
MONOCYTES NFR BLD AUTO: 5.8 %
NEUTROPHILS # BLD AUTO: 12.34 X10(3)/MCL (ref 2.1–9.2)
NEUTROPHILS NFR BLD AUTO: 86.8 %
NRBC BLD AUTO-RTO: 0 %
OHS QRS DURATION: 92 MS
OHS QTC CALCULATION: 419 MS
PLATELET # BLD AUTO: 180 X10(3)/MCL (ref 130–400)
PMV BLD AUTO: 11.4 FL (ref 7.4–10.4)
POCT GLUCOSE: 152 MG/DL (ref 70–110)
POCT GLUCOSE: 177 MG/DL (ref 70–110)
POCT GLUCOSE: 200 MG/DL (ref 70–110)
POTASSIUM SERPL-SCNC: 4.4 MMOL/L (ref 3.5–5.1)
RBC # BLD AUTO: 4.36 X10(6)/MCL (ref 4.7–6.1)
SODIUM SERPL-SCNC: 135 MMOL/L (ref 136–145)
VANCOMYCIN TROUGH SERPL-MCNC: 14.9 UG/ML (ref 15–20)
WBC # SPEC AUTO: 14.21 X10(3)/MCL (ref 4.5–11.5)

## 2024-02-15 PROCEDURE — 93010 ELECTROCARDIOGRAM REPORT: CPT | Mod: ,,, | Performed by: INTERNAL MEDICINE

## 2024-02-15 PROCEDURE — 63600175 PHARM REV CODE 636 W HCPCS: Performed by: EMERGENCY MEDICINE

## 2024-02-15 PROCEDURE — 85025 COMPLETE CBC W/AUTO DIFF WBC: CPT | Performed by: INTERNAL MEDICINE

## 2024-02-15 PROCEDURE — 63600175 PHARM REV CODE 636 W HCPCS: Performed by: INTERNAL MEDICINE

## 2024-02-15 PROCEDURE — 25000003 PHARM REV CODE 250: Performed by: INTERNAL MEDICINE

## 2024-02-15 PROCEDURE — 97116 GAIT TRAINING THERAPY: CPT | Mod: CQ

## 2024-02-15 PROCEDURE — 80048 BASIC METABOLIC PNL TOTAL CA: CPT | Performed by: INTERNAL MEDICINE

## 2024-02-15 PROCEDURE — 21400001 HC TELEMETRY ROOM

## 2024-02-15 PROCEDURE — 93005 ELECTROCARDIOGRAM TRACING: CPT

## 2024-02-15 PROCEDURE — 87641 MR-STAPH DNA AMP PROBE: CPT | Performed by: INTERNAL MEDICINE

## 2024-02-15 PROCEDURE — 97530 THERAPEUTIC ACTIVITIES: CPT | Mod: CQ

## 2024-02-15 PROCEDURE — 11000001 HC ACUTE MED/SURG PRIVATE ROOM

## 2024-02-15 PROCEDURE — 80202 ASSAY OF VANCOMYCIN: CPT | Performed by: INTERNAL MEDICINE

## 2024-02-15 PROCEDURE — 97535 SELF CARE MNGMENT TRAINING: CPT | Mod: CO

## 2024-02-15 RX ORDER — DOXYCYCLINE HYCLATE 100 MG
100 TABLET ORAL EVERY 12 HOURS
Status: DISCONTINUED | OUTPATIENT
Start: 2024-02-15 | End: 2024-02-16

## 2024-02-15 RX ORDER — CIPROFLOXACIN 500 MG/1
500 TABLET ORAL EVERY 12 HOURS
Status: COMPLETED | OUTPATIENT
Start: 2024-02-15 | End: 2024-02-19

## 2024-02-15 RX ORDER — METHOCARBAMOL 500 MG/1
500 TABLET, FILM COATED ORAL 3 TIMES DAILY
Status: DISCONTINUED | OUTPATIENT
Start: 2024-02-15 | End: 2024-02-17

## 2024-02-15 RX ORDER — AMLODIPINE BESYLATE 5 MG/1
5 TABLET ORAL DAILY
Status: DISCONTINUED | OUTPATIENT
Start: 2024-02-16 | End: 2024-02-16

## 2024-02-15 RX ADMIN — ONDANSETRON 4 MG: 2 INJECTION INTRAMUSCULAR; INTRAVENOUS at 04:02

## 2024-02-15 RX ADMIN — ATORVASTATIN CALCIUM 40 MG: 40 TABLET, FILM COATED ORAL at 09:02

## 2024-02-15 RX ADMIN — VANCOMYCIN HYDROCHLORIDE 1250 MG: 1.25 INJECTION, POWDER, LYOPHILIZED, FOR SOLUTION INTRAVENOUS at 05:02

## 2024-02-15 RX ADMIN — ASPIRIN 81 MG: 81 TABLET, COATED ORAL at 09:02

## 2024-02-15 RX ADMIN — POLYETHYLENE GLYCOL 3350 17 G: 17 POWDER, FOR SOLUTION ORAL at 09:02

## 2024-02-15 RX ADMIN — LACTULOSE 30 G: 10 SOLUTION ORAL at 09:02

## 2024-02-15 RX ADMIN — ENOXAPARIN SODIUM 40 MG: 100 INJECTION SUBCUTANEOUS at 04:02

## 2024-02-15 RX ADMIN — CIPROFLOXACIN HYDROCHLORIDE 500 MG: 500 TABLET, FILM COATED ORAL at 09:02

## 2024-02-15 RX ADMIN — METHOCARBAMOL 500 MG: 500 TABLET ORAL at 09:02

## 2024-02-15 RX ADMIN — DOXYCYCLINE HYCLATE 100 MG: 100 TABLET, COATED ORAL at 09:02

## 2024-02-15 RX ADMIN — AMLODIPINE BESYLATE 10 MG: 5 TABLET ORAL at 09:02

## 2024-02-15 RX ADMIN — INSULIN ASPART 2 UNITS: 100 INJECTION, SOLUTION INTRAVENOUS; SUBCUTANEOUS at 10:02

## 2024-02-15 RX ADMIN — INSULIN ASPART 2 UNITS: 100 INJECTION, SOLUTION INTRAVENOUS; SUBCUTANEOUS at 06:02

## 2024-02-15 RX ADMIN — INSULIN DETEMIR 22 UNITS: 100 INJECTION, SOLUTION SUBCUTANEOUS at 10:02

## 2024-02-15 NOTE — PT/OT/SLP PROGRESS
Occupational Therapy   Treatment    Name: Silvano Perry  MRN: [id number]  Admitting Diagnosis:  Uncontrolled type 2 diabetes mellitus with hyperglycemia       Recommendations:     Recommended therapy intensity at discharge: Moderate Intensity Therapy   Discharge Equipment Recommendations:  grab bar  Barriers to discharge:       Assessment:     Silvano Perry is a 68 y.o. male with a medical diagnosis of Uncontrolled type 2 diabetes mellitus with hyperglycemia. Performance deficits affecting function are weakness, impaired endurance, impaired self care skills, impaired functional mobility, gait instability, impaired balance, decreased safety awareness. Tolerated session well and motivated to participate. Would benefit from moderate intensity therapy at d/c prior to return home to improve overall function and independence.     Rehab Prognosis:  Good; patient would benefit from acute skilled OT services to address these deficits and reach maximum level of function.       Plan:     Patient to be seen 4 x/week to address the above listed problems via self-care/home management, therapeutic activities, therapeutic exercises  Plan of Care Expires: 03/11/24  Plan of Care Reviewed with: patient    Subjective     Pain/Comfort:  Pain Rating 1: 0/10    Objective:     Communicated with: RN prior to session.  Patient found up in chair with telemetry upon OT entry to room.    General Precautions: Standard, fall    Orthopedic Precautions:N/A  Braces: N/A  Respiratory Status: Room air     Occupational Performance:     Functional Mobility/Transfers:  Patient completed Sit <> Stand Transfer with minimum assistance  with  rolling walker  x1 trial from chair.   Functional Mobility: walked in hallway ~70 ft with CGA and RW. Pt unsteady however no major LOB noted.     Activities of Daily Living:  Grooming: completed oral hygiene standing at sink with CGA.     Therapeutic Positioning    OT interventions performed during the course of today's  session in an effort to prevent and/or reduce acquired pressure injuries:   Education was provided on benefits of and recommendations for therapeutic positioning    Skin assessment: all bony prominences were assessed    Findings: no redness or breakdown noted    [facility] 6 Click ADL:      Patient Education:  Patient provided with verbal education education regarding OT role/goals/POC, fall prevention, safety awareness, and Discharge/DME recommendations.  Understanding was verbalized.      Patient left up in chair with all lines intact and call button in reach    GOALS:   Multidisciplinary Problems       Occupational Therapy Goals          Problem: Occupational Therapy    Goal Priority Disciplines Outcome Interventions   Occupational Therapy Goal     OT, PT/OT Ongoing, Progressing    Description: LTG: Pt will perform basic ADLs and ADL transfers with Modified independence using LRAD by discharge.    STG: to be met by 3/11/2024    Pt will complete grooming standing at sink with LRAD with SBA.  Pt will complete UB dressing with SBA.  Pt will complete LB dressing with SBA using LRAD.  Pt will complete toileting with SBA using LRAD.  Pt will complete functional mobility to/from toilet and toilet transfer with SBA using LRAD.                         Time Tracking:     OT Date of Treatment: 02/15/24  OT Start Time: 1422  OT Stop Time: 1433  OT Total Time (min): 11 min    Billable Minutes:Self Care/Home Management 11    OT/FLOWER: FLOWER     Number of FLOWER visits since last OT visit: 1    2/15/2024

## 2024-02-15 NOTE — PROGRESS NOTES
[facility] Hospital Medicine Progress Note        Chief Complaint: Inpatient Follow-up    HPI:     68-year-old male with significant history of type 2 diabetes mellitus, diabetic foot wound several years ago [facility] [employer]  Patient fell on his left side of the chest and hit his head.  No loss of consciousness.  Patient has been noncompliant with all his medications for the past 2 years due to lack of insurance.  Reported generalized weakness, polyuria and polydipsia for the past 1 week.  Patient was found to have acute kidney injury, hyperglycemia and UTI at [facility] facility.  CT abdomen/pelvis was unremarkable. patient was transferred to [facility] for admission under hospitalist medicine service.  Unsure if he had a near syncopal episode. CT head, ultrasound carotid and echocardiogram ordered. initiated IV fluids. initiated long-acting insulin. patient also was hypertensive and therefore initiated on antihypertensives . ceftriaxone for UTI pending urine cultures .case management consulted to assist with DC disposition.  Patient has bilateral carotid artery disease with 50-69 % stenosis.  Echocardiogram with diastolic dysfunction, no valvular heart disease, EF normal.  Urine cultures-Enterococcus    Interval Hx:     Patient seen at bedside, hemodynamics stable, BP low normal at times, complaining of generalized weakness and also pain left side of the chest, reports falling on that side and hitting left side of the chest. CBG is still not at goal, above 200    Objective/physical exam:  General: In no acute distress, afebrile  Chest: Clear to auscultation bilaterally  Heart: S1, S2, no appreciable murmur  Abdomen: Soft, nontender, BS +  MSK: Warm, no lower extremity edema, no clubbing or cyanosis  Neurologic: Alert and oriented x4, globally weak, but more weakness on left side  VITAL SIGNS: 24 HRS MIN & MAX LAST   Temp  Min: 97.8 °F (36.6 °C)   Max: 98.3 °F (36.8 °C) 97.8 °F (36.6 °C)   BP  Min: 122/71  Max: 168/81 130/76   Pulse  Min: 70  Max: 84  74   Resp  Min: 18  Max: 18 18   SpO2  Min: 96 %  Max: 98 % 96 %       Recent Labs   Lab 02/12/24  0631   WBC 7.79   RBC 4.19*   HGB 11.6*   HCT 35.7*   MCV 85.2   MCH 27.7   MCHC 32.5*   RDW 13.8      MPV 12.1*         Recent Labs   Lab 02/11/24  0432 02/12/24  0631 02/14/24  0415    137  --    K 3.8 3.7  --    CO2 21* 20*  --    BUN 19.2 13.3  --    CREATININE 0.83 0.66* 0.72*   CALCIUM 8.5* 7.7*  --    MG 1.90  --   --    ALBUMIN 3.1*  --   --    ALKPHOS 126  --   --    ALT 43  --   --    AST 24  --   --    BILITOT 0.4  --   --           Microbiology Results (last 7 days)       Procedure Component Value Units Date/Time    Urine culture [[id number]]  (Abnormal)  (Susceptibility) Collected: 02/10/24 1745    Order Status: Completed Specimen: Urine Updated: 02/14/24 1105     Urine Culture >/= 100,000 colonies/ml Enterococcus faecalis             Scheduled Med:   amLODIPine  10 mg Oral Daily    aspirin  81 mg Oral Daily    atorvastatin  40 mg Oral QHS    ciprofloxacin HCl  500 mg Oral Q12H    enoxparin  40 mg Subcutaneous Q24H (prophylaxis, 1700)    insulin detemir U-100  20 Units Subcutaneous QHS    lactulose 10 gram/15 ml  30 g Oral BID    polyethylene glycol  17 g Oral BID    vancomycin (VANCOCIN) IV (PEDS and ADULTS)  1,500 mg Intravenous Q12H          Assessment/Plan:    Near syncope with fall  Bilateral carotid artery disease-50-69% stenosis  Mild left-sided weakness-rule out CVA   Traumatic left sided rib fractures   Left-sided pneumonia-cap  Acute Enterococcus UTI  Sepsis secondary to both above  Poorly-controlled type 2 diabetes mellitus with hyperglycemia , A1c 11.6  New diagnosis HTN  HLD   Prophylaxis    Patient has bilateral carotid artery disease with 50-69% stenosis on both sides   On aspirin, high-intensity statin  Will need close outpatient surveillance, repeat ultrasound in 6 months  and follow up with vascular  Echocardiogram with diastolic dysfunction, otherwise unremarkable   Given mild weakness on left side I will obtain MRI brain without contrast to rule out CVA as cause of fall  Given left-sided chest pain chest x-ray was ordered which is consistent with traumatic left fracture   On p.r.n. narcotics   Add scheduled Robaxin for 2 days  Also noted pneumonia   Patient has Enterococcus UTI and is on IV vancomycin   I have discontinued vancomycin and switch to p.o. ciprofloxacin   Given pneumonia add doxycycline and order MRSA PCR  MRSA PCR is negative can DC doxycycline  Worsening leukocytosis noted today likely secondary to being on inappropriate antibiotics  Patient is afebrile  Diabetes poorly controlled   Increase Levemir to 22 units q.h.s.  Continue sliding scale  A1c is more than 11 and therefore will need long and short-acting insulin upon DC  BP low normal on amlodipine and I have decreased dose to 5 mg daily  Continue bowel regimen  DVT prophylaxis-on subcu Lovenox    Patient can go home with home health possibly tomorrow if MRI brain negative and also WBC count is better on appropriate antibiotics  Will have to make sure medications covered by insurance and insulin is affordable   Will need close follow-up with the primary care physician          Bren Sargent MD   02/15/2024

## 2024-02-15 NOTE — PT/OT/SLP PROGRESS
Physical Therapy Treatment    Patient Name:  Silvano Perry   MRN:  [id number]    Recommendations:     Discharge therapy intensity: Moderate Intensity Therapy   Discharge Equipment Recommendations: to be determined by next level of care  Barriers to discharge: Decreased caregiver support and Impaired mobility    Assessment:     Silvano Perry is a 68 y.o. male admitted with a medical diagnosis of T2DM.  He presents with the following impairments/functional limitations: weakness, impaired endurance, gait instability, impaired balance, decreased safety awareness, pain .    Pt still currently modA to stand from chair. Pt states his surfaces at home are low like the chair.  Pt states he has no one to assist him at home. Rec placement at this time unless pt progresses during hospital stay. NSG aware.     Rehab Prognosis: Good; patient would benefit from acute skilled PT services to address these deficits and reach maximum level of function.    Recent Surgery: * No surgery found *      Plan:     During this hospitalization, patient to be seen 5 x/week to address the identified rehab impairments via gait training, therapeutic activities, therapeutic exercises, neuromuscular re-education and progress toward the following goals:    Plan of Care Expires:  03/11/24    Subjective     Chief Complaint:   Patient/Family Comments/goals:   Pain/Comfort:  Location - Side 1: Left  Location 1: rib(s)  Pain Addressed 1: Reposition, Distraction      Objective:     Communicated with NSG prior to session.  Patient found HOB elevated with   upon PT entry to room.     General Precautions: Standard, fall  Orthopedic Precautions: N/A  Braces: N/A  Respiratory Status: Room air  Blood Pressure:   Skin Integrity: Visible skin intact      Functional Mobility:  Bed Mobility:     Scooting: contact guard assistance  Supine to Sit: minimum assistance  Transfers:     Sit to Stand:  minimum assistance and moderate assistance with rolling walker and 1 trial  from EOB ([place]) and 3 trials from bedside chair (modA). Pt with hard posterior lean upon standing from chair and required increased time to correct his balance.   Gait: pt amb 70ft with RW CGA. Pt with step-to gait pattern and unsteady gait. No major LOB.       Patient left up in chair with call button in reach..    GOALS:   Multidisciplinary Problems       Physical Therapy Goals          Problem: Physical Therapy    Goal Priority Disciplines Outcome Goal Variances Interventions   Physical Therapy Goal     PT, PT/OT Ongoing, Progressing     Description: Goals to be met by: 3/11/24     Patient will increase functional independence with mobility by performin. Supine to sit with [place]  2. Sit to supine with [place]  3. Sit to stand transfer with Modified [place]  4. Gait  x 300 feet with Modified [place] using Rolling Walker.                          Time Tracking:     PT Received On: 02/15/24  PT Start Time: 1042     PT Stop Time: 1105  PT Total Time (min): 23 min     Billable Minutes: Gait Training 15 and Therapeutic Activity 8    Treatment Type: Treatment  PT/PTA: PTA     Number of PTA visits since last PT visit: 2     02/15/2024

## 2024-02-15 NOTE — PROGRESS NOTES
"Pharmacokinetic Assessment Follow Up: IV Vancomycin    Vancomycin serum concentration assessment(s):    The trough level was drawn correctly and can be used to guide therapy at this time. The measurement is below the desired definitive target range of 15 to 20 mcg/mL.    Vancomycin Regimen Plan:    Change regimen to Vancomycin 1500 mg IV every 12 hours with next serum trough concentration measured at 0400 on 02/17    Drug levels (last 3 results):  Recent Labs   Lab Result Units 02/15/24  0413   Vancomycin Trough ug/ml 14.9*       Pharmacy will continue to follow and monitor vancomycin.    Please contact pharmacy at extension [phone] for questions regarding this assessment.    Thank you for the consult,   Jacquelin Nicholson       Patient brief summary:  Silvano Perry is a 68 y.o. male initiated on antimicrobial therapy with IV Vancomycin for treatment of urinary tract infection    Drug Allergies:   Review of patient's allergies indicates:  No Known Allergies    Actual Body Weight:   73.3 kg    Renal Function:   Estimated Creatinine Clearance: 104.2 mL/min (A) (based on SCr of 0.72 mg/dL (L)).,     Dialysis Method (if applicable):  N/A    CBC (last 72 hours):  No results for input(s): "WHITE BLOOD CELL COUNT", "HEMOGLOBIN", "HEMATOCRIT", "PLATELETS", "GRAN%", "LYMPH%", "MONO%", "EOSINOPHIL%", "BASOPHIL%", "DIFFERENTIAL METHOD" in the last 72 hours.    Metabolic Panel (last 72 hours):  Recent Labs   Lab Result Units 02/13/24  0617 02/14/24  0415   Urine Sodium mmol/L 93.0  --    Creatinine mg/dL  --  0.72*   Urine Creatinine mg/dL 90.5  --        Vancomycin Administrations:  vancomycin given in the last 96 hours                     vancomycin 1.25 g in dextrose 5% 250 mL IVPB (ready to mix) (mg) 1,250 mg New Bag 02/15/24 0518     1,250 mg New Bag 02/14/24 1623     1,250 mg New Bag  0442    vancomycin 1.5 g in dextrose 5 % 250 mL IVPB (ready to mix) (mg) 1,500 mg New Bag 02/13/24 1649                    Microbiologic " Results:  Microbiology Results (last 7 days)       Procedure Component Value Units Date/Time    Urine culture [[id number]]  (Abnormal)  (Susceptibility) Collected: 02/10/24 7074    Order Status: Completed Specimen: Urine Updated: 02/14/24 1105     Urine Culture >/= 100,000 colonies/ml Enterococcus faecalis

## 2024-02-15 NOTE — PLAN OF CARE
CRISTAL Emery completed with patient over the phone. He is aware that he is under IP stay now and form is no longer active. Email sent to DONNIE Mann for delivery.

## 2024-02-16 LAB
MRSA PCR SCRN (OHS): NOT DETECTED
POCT GLUCOSE: 133 MG/DL (ref 70–110)
POCT GLUCOSE: 137 MG/DL (ref 70–110)
POCT GLUCOSE: 173 MG/DL (ref 70–110)

## 2024-02-16 PROCEDURE — 63600175 PHARM REV CODE 636 W HCPCS: Performed by: INTERNAL MEDICINE

## 2024-02-16 PROCEDURE — 97530 THERAPEUTIC ACTIVITIES: CPT | Mod: CQ

## 2024-02-16 PROCEDURE — 11000001 HC ACUTE MED/SURG PRIVATE ROOM

## 2024-02-16 PROCEDURE — 25000003 PHARM REV CODE 250: Performed by: INTERNAL MEDICINE

## 2024-02-16 PROCEDURE — 25000003 PHARM REV CODE 250: Performed by: NURSE PRACTITIONER

## 2024-02-16 PROCEDURE — 97116 GAIT TRAINING THERAPY: CPT | Mod: CQ

## 2024-02-16 RX ORDER — SIMETHICONE 80 MG
1 TABLET,CHEWABLE ORAL 3 TIMES DAILY PRN
Status: DISCONTINUED | OUTPATIENT
Start: 2024-02-16 | End: 2024-02-21 | Stop reason: HOSPADM

## 2024-02-16 RX ORDER — AMLODIPINE BESYLATE 2.5 MG/1
2.5 TABLET ORAL DAILY
Status: DISCONTINUED | OUTPATIENT
Start: 2024-02-17 | End: 2024-02-21 | Stop reason: HOSPADM

## 2024-02-16 RX ORDER — BISACODYL 10 MG/1
10 SUPPOSITORY RECTAL DAILY PRN
Status: DISCONTINUED | OUTPATIENT
Start: 2024-02-16 | End: 2024-02-21 | Stop reason: HOSPADM

## 2024-02-16 RX ADMIN — CIPROFLOXACIN HYDROCHLORIDE 500 MG: 500 TABLET, FILM COATED ORAL at 09:02

## 2024-02-16 RX ADMIN — DOXYCYCLINE HYCLATE 100 MG: 100 TABLET, COATED ORAL at 09:02

## 2024-02-16 RX ADMIN — METHOCARBAMOL 500 MG: 500 TABLET ORAL at 09:02

## 2024-02-16 RX ADMIN — INSULIN DETEMIR 22 UNITS: 100 INJECTION, SOLUTION SUBCUTANEOUS at 09:02

## 2024-02-16 RX ADMIN — BISACODYL 10 MG: 10 SUPPOSITORY RECTAL at 12:02

## 2024-02-16 RX ADMIN — METHOCARBAMOL 500 MG: 500 TABLET ORAL at 02:02

## 2024-02-16 RX ADMIN — SIMETHICONE 80 MG: 80 TABLET, CHEWABLE ORAL at 12:02

## 2024-02-16 RX ADMIN — ENOXAPARIN SODIUM 40 MG: 100 INJECTION SUBCUTANEOUS at 04:02

## 2024-02-16 RX ADMIN — ASPIRIN 81 MG: 81 TABLET, COATED ORAL at 09:02

## 2024-02-16 RX ADMIN — AMLODIPINE BESYLATE 5 MG: 5 TABLET ORAL at 09:02

## 2024-02-16 RX ADMIN — POLYETHYLENE GLYCOL 3350 17 G: 17 POWDER, FOR SOLUTION ORAL at 09:02

## 2024-02-16 NOTE — PROGRESS NOTES
[facility] Hospital Medicine Progress Note        Chief Complaint: Inpatient Follow-up    HPI:     68-year-old male with significant history of type 2 diabetes mellitus, diabetic foot wound several years ago presented to [facility] facility after a fall at [employer].  Patient fell on his left side of the chest and hit his head.  No loss of consciousness.  Patient has been noncompliant with all his medications for the past 2 years due to lack of insurance.  Reported generalized weakness, polyuria and polydipsia for the past 1 week.  Patient was found to have acute kidney injury, hyperglycemia and UTI at [facility].  CT abdomen/pelvis was unremarkable. patient was transferred to [facility] for admission under hospitalist medicine service.  Unsure if he had a near syncopal episode. CT head, ultrasound carotid and echocardiogram ordered. initiated IV fluids. initiated long-acting insulin. patient also was hypertensive and therefore initiated on antihypertensives . ceftriaxone for UTI pending urine cultures .case management consulted to assist with DC disposition.  Patient has bilateral carotid artery disease with 50-69 % stenosis.  Echocardiogram with diastolic dysfunction, no valvular heart disease, EF normal.  Urine cultures-Enterococcus    Interval Hx:   No acute events reported overnight.  Patient seen at bedside,he complains of generalized weakness , more in his proximal thighs,he finds  more difficulty when attempted to stand than walking on flat surface     Objective/physical exam:  General: In no acute distress, afebrile  Chest: unlabored breathing   Heart: normal rate  Abdomen: Soft, nontender  MSK: Warm, no lower extremity edema  Neurologic: Alert and oriented , globally weak  VITAL SIGNS: 24 HRS MIN & MAX LAST   Temp  Min: 97.9 °F (36.6 °C)  Max: 99 °F (37.2 °C) 97.9 °F (36.6 °C)   BP  Min: 99/61  Max: 158/81 112/69   Pulse  Min: 70  Max: 99  92   Resp  Min: 16  Max: 18 18    SpO2  Min: 94 %  Max: 99 % (!) 94 %       Recent Labs   Lab 02/15/24  0837   WBC 14.21*   RBC 4.36*   HGB 12.1*   HCT 37.1*   MCV 85.1   MCH 27.8   MCHC 32.6*   RDW 13.9      MPV 11.4*         Recent Labs   Lab 02/11/24  0432 02/12/24  0631 02/15/24  0836      < > 135*   K 3.8   < > 4.4   CO2 21*   < > 23   BUN 19.2   < > 16.8   CREATININE 0.83   < > 0.73   CALCIUM 8.5*   < > 8.2*   MG 1.90  --   --    ALBUMIN 3.1*  --   --    ALKPHOS 126  --   --    ALT 43  --   --    AST 24  --   --    BILITOT 0.4  --   --     < > = values in this interval not displayed.          Microbiology Results (last 7 days)       Procedure Component Value Units Date/Time    Urine culture [[id number]]  (Abnormal)  (Susceptibility) Collected: 02/10/24 2388    Order Status: Completed Specimen: Urine Updated: 02/14/24 1105     Urine Culture >/= 100,000 colonies/ml Enterococcus faecalis             Scheduled Med:   amLODIPine  5 mg Oral Daily    aspirin  81 mg Oral Daily    atorvastatin  40 mg Oral QHS    ciprofloxacin HCl  500 mg Oral Q12H    doxycycline  100 mg Oral Q12H    enoxparin  40 mg Subcutaneous Q24H (prophylaxis, 1700)    insulin detemir U-100  22 Units Subcutaneous QHS    lactulose 10 gram/15 ml  30 g Oral BID    methocarbamoL  500 mg Oral TID    polyethylene glycol  17 g Oral BID          Assessment/Plan:  Near syncope with fall  Bilateral carotid artery disease-50-69% stenosis  Mild left-sided weakness-rule out CVA   ? Proximal muscle weakness   Traumatic left sided rib fractures   ?Left-sided pneumonia-cap  Acute Enterococcus UTI  Sepsis secondary to both above  Poorly-controlled type 2 diabetes mellitus with hyperglycemia , A1c 11.6  New diagnosis HTN  HLD     Check esr, crp, CK, LFTs  Pt on asa, high dose statin for  bilateral carotid artery disease with 50-69% stenosis on both sides .Will need close outpatient surveillance, repeat ultrasound in 6 months and follow up with vascular  Hold high dose statin for now    Echocardiogram with diastolic dysfunction, otherwise unremarkable    MRI brain without contrast to rule out CVA -pending   Cont  p.r.n. narcotics  Robaxin   Patient has Enterococcus UTI, cont cipro    MRSA PCR is negative , DC doxycycline  Cbgs improved 137 this AM cont  Levemir 22 units q.h.s.,Continue sliding scale  A1c is more than 11 and therefore will need long and short-acting insulin upon DC  BP mostly in low normal rage except 1/2 readings in 150s , pt on amlodipine 5, change amlodipine to 2.5 to be administered in PM   Continue bowel regimen  Cont therapy services   Care discussed with pt's nurse , case mngt     DVT prophylaxis-on subcu Lovenox    Dispo: placement         Morena Chavez MD   02/16/2024

## 2024-02-16 NOTE — PT/OT/SLP PROGRESS
Physical Therapy Treatment    Patient Name:  Silvano Perry   MRN:  [id number]    Recommendations:     Discharge therapy intensity: Moderate Intensity Therapy   Discharge Equipment Recommendations: to be determined by next level of care  Barriers to discharge: Decreased caregiver support and Impaired mobility    Assessment:     Silvano Perry is a 68 y.o. male admitted with a medical diagnosis of T2DM.  He presents with the following impairments/functional limitations: weakness, impaired endurance, gait instability, impaired balance, decreased safety awareness, pain .    Pt still currently min-modA to stand from chair. Pt states his surfaces at home are low like the chair.  Pt states he has no one to assist him at home. Rec placement at this time unless pt progresses during hospital stay. NSG aware.     Rehab Prognosis: Good; patient would benefit from acute skilled PT services to address these deficits and reach maximum level of function.    Recent Surgery: * No surgery found *      Plan:     During this hospitalization, patient to be seen 5 x/week to address the identified rehab impairments via gait training, therapeutic activities, therapeutic exercises, neuromuscular re-education and progress toward the following goals:    Plan of Care Expires:  03/11/24    Subjective     Chief Complaint:   Patient/Family Comments/goals:   Pain/Comfort:  Location - Side 1: Left  Location 1: rib(s)  Pain Addressed 1: Reposition, Distraction      Objective:     Communicated with NSG prior to session.  Patient found up in chair with telemetry upon PT entry to room.     General Precautions: Standard, fall  Orthopedic Precautions: N/A  Braces: N/A  Respiratory Status: Room air  Blood Pressure:   Skin Integrity: Visible skin intact      Functional Mobility:  Transfers:     Sit to Stand:  minimum assistance and moderate assistance with rolling walker and 6 trials from bedside chair. Pt varied min-modA. Pt required vcs for increased rocking  for momentum to assist with standing. Once pt able to complete this his sit to stands improved.    Gait: pt amb 74ft with RW CGA. Pt with step-to gait pattern and unsteady gait. No major LOB.       Patient left up in chair with call button in reach..    GOALS:   Multidisciplinary Problems       Physical Therapy Goals          Problem: Physical Therapy    Goal Priority Disciplines Outcome Goal Variances Interventions   Physical Therapy Goal     PT, PT/OT Ongoing, Progressing     Description: Goals to be met by: 3/11/24     Patient will increase functional independence with mobility by performin. Supine to sit with [place]  2. Sit to supine with [place]  3. Sit to stand transfer with Modified [place]  4. Gait  x 300 feet with Modified [place] using Rolling Walker.                          Time Tracking:     PT Received On: 24  PT Start Time: 1358     PT Stop Time: 1422  PT Total Time (min): 24 min     Billable Minutes: Gait Training 14 and Therapeutic Activity 10    Treatment Type: Treatment  PT/PTA: PTA     Number of PTA visits since last PT visit: 3     2024

## 2024-02-16 NOTE — PROGRESS NOTES
Inpatient Nutrition Evaluation    Admit Date: 2/10/2024   Total duration of encounter: 6 days   Patient Age: 68 y.o.    Nutrition Recommendation/Prescription     Continue diabetic diet as tolerated  Continue Boost daily to provide 240 kcal and 10 g protein per serving  RD to monitor po intake and weight    Nutrition Assessment     Chart Review    Reason Seen: length of stay    Malnutrition Screening Tool Results   Have you recently lost weight without trying?: No  Have you been eating poorly because of a decreased appetite?: No   MST Score: 0   Diagnosis:  Near syncope with fall  Bilateral carotid artery disease-50-69% stenosis  Mild left-sided weakness-rule out CVA   ? Proximal muscle weakness   Traumatic left sided rib fractures   ?Left-sided pneumonia-cap  Acute Enterococcus UTI  Sepsis secondary to both above  Poorly-controlled type 2 diabetes mellitus with hyperglycemia , A1c 11.6  New diagnosis HTN  HLD     Relevant Medical History: DM 2, diabetic foot wound    Scheduled Medications:  [START ON 2/17/2024] amLODIPine, 2.5 mg, Daily  aspirin, 81 mg, Daily  ciprofloxacin HCl, 500 mg, Q12H  enoxparin, 40 mg, Q24H (prophylaxis, 1700)  insulin detemir U-100, 22 Units, QHS  lactulose 10 gram/15 ml, 30 g, BID  methocarbamoL, 500 mg, TID  polyethylene glycol, 17 g, BID    Continuous Infusions:   PRN Medications: acetaminophen, aluminum-magnesium hydroxide-simethicone, bisacodyL, cloNIDine, dextrose 10%, dextrose 10%, glucagon (human recombinant), glucose, glucose, hydrALAZINE, HYDROcodone-acetaminophen, insulin aspart U-100, labetalol, melatonin, naloxone, ondansetron, simethicone, sodium chloride 0.9%    Recent Labs   Lab 02/10/24  1633 02/10/24  1745 02/11/24  0432 02/12/24  0631 02/14/24  0415 02/15/24  0836 02/15/24  0837     --  136 137  --  135*  --    K 5.2*  --  3.8 3.7  --  4.4  --    CALCIUM 9.9  --  8.5* 7.7*  --  8.2*  --    PHOS  --   --  2.0*  --   --   --   --    MG  --   --  1.90  --   --   --    "--    CHLORIDE 101  --  108* 113*  --  105  --    CO2 25  --  21* 20*  --  23  --    BUN 18.7  --  19.2 13.3  --  16.8  --    CREATININE 1.38*  --  0.83 0.66* 0.72* 0.73  --    EGFRNORACEVR 56  --  >60 >60 >60 >60  --    GLUCOSE 444*  --  217* 122*  --  176*  --    BILITOT 0.5  --  0.4  --   --   --   --    ALKPHOS 164*  --  126  --   --   --   --    ALT 56*  --  43  --   --   --   --    AST 30  --  24  --   --   --   --    ALBUMIN 3.8  --  3.1*  --   --   --   --    TRIG  --   --  105  --   --   --   --    HGBA1C  --  11.6*  --   --   --   --   --    WBC 14.07*  --  12.06* 7.79  --   --  14.21*   HGB 14.8  --  13.0* 11.6*  --   --  12.1*   HCT 45.4  --  40.2* 35.7*  --   --  37.1*     Nutrition Orders:  Diet full liquid  Dietary nutrition supplements Boost Chocolate; Daily    Appetite/Oral Intake: good/% of meals  Factors Affecting Nutritional Intake: none identified  Food/Taoist/Cultural Preferences: none reported  Food Allergies: no known food allergies  Last Bowel Movement: 24  Wound(s):      Comments    : Pt reports good appetite, denies decreased appetite prior admission. Denies GI complaints, BM today. UBW reported ~165-170# and denies unintentional weight loss. Bed weight today of 163#. Weight appears stable per EMR weights.    Anthropometrics    Height: 5' 10" (177.8 cm),    Last Weight: 74.2 kg (163 lb 9.6 oz) (24 1631), Weight Method: Bed Scale  BMI (Calculated): 23.5  BMI Classification: normal (BMI 18.5-24.9)        Ideal Body Weight (IBW), Male: 166 lb     % Ideal Body Weight, Male (lb): 97.29 %                 Usual Body Weight (UBW), k kg  % Usual Body Weight: 99.15  % Weight Change From Usual Weight: -1.06 %  Usual Weight Provided By: patient, EMR weight history, and patient denies unintentional weight loss    Wt Readings from Last 5 Encounters:   24 74.2 kg (163 lb 9.6 oz)   22 74.8 kg (165 lb)   12/15/22 77.1 kg (170 lb)   07/10/22 79.4 kg (175 lb 0.7 oz) "     Weight Change(s) Since Admission:   Wt Readings from Last 1 Encounters:   02/16/24 1631 74.2 kg (163 lb 9.6 oz)   02/10/24 2050 73.3 kg (161 lb 8 oz)   02/10/24 1543 77.1 kg (170 lb)   Admit Weight: 77.1 kg (170 lb) (02/10/24 1543), Weight Method: Bed Scale    Patient Education     Not applicable.    Nutrition Goals & Monitoring     Dietitian will monitor: food and beverage intake and weight    Nutrition Risk/Follow-Up: low (follow-up in 5-7 days)  Patients assigned 'low nutrition risk' status do not qualify for a full nutritional assessment but will be monitored and re-evaluated in a 5-7 day time period. Please consult if re-evaluation needed sooner.

## 2024-02-17 LAB
ALBUMIN SERPL-MCNC: 2.8 G/DL (ref 3.4–4.8)
ALBUMIN/GLOB SERPL: 1.1 RATIO (ref 1.1–2)
ALP SERPL-CCNC: 116 UNIT/L (ref 40–150)
ALT SERPL-CCNC: 46 UNIT/L (ref 0–55)
AST SERPL-CCNC: 31 UNIT/L (ref 5–34)
BASOPHILS # BLD AUTO: 0.06 X10(3)/MCL
BASOPHILS NFR BLD AUTO: 0.5 %
BILIRUB SERPL-MCNC: 0.4 MG/DL
BUN SERPL-MCNC: 25.8 MG/DL (ref 8.4–25.7)
CALCIUM SERPL-MCNC: 8.6 MG/DL (ref 8.8–10)
CHLORIDE SERPL-SCNC: 105 MMOL/L (ref 98–107)
CK SERPL-CCNC: 134 U/L (ref 30–200)
CO2 SERPL-SCNC: 23 MMOL/L (ref 23–31)
CREAT SERPL-MCNC: 0.72 MG/DL (ref 0.73–1.18)
CRP SERPL-MCNC: 17.2 MG/L
EOSINOPHIL # BLD AUTO: 0.06 X10(3)/MCL (ref 0–0.9)
EOSINOPHIL NFR BLD AUTO: 0.5 %
ERYTHROCYTE [DISTWIDTH] IN BLOOD BY AUTOMATED COUNT: 14.2 % (ref 11.5–17)
ERYTHROCYTE [SEDIMENTATION RATE] IN BLOOD: 63 MM/HR (ref 0–15)
GFR SERPLBLD CREATININE-BSD FMLA CKD-EPI: >60 MLS/MIN/1.73/M2
GLOBULIN SER-MCNC: 2.5 GM/DL (ref 2.4–3.5)
GLUCOSE SERPL-MCNC: 95 MG/DL (ref 82–115)
HCT VFR BLD AUTO: 33.5 % (ref 42–52)
HGB BLD-MCNC: 11 G/DL (ref 14–18)
IMM GRANULOCYTES # BLD AUTO: 0.03 X10(3)/MCL (ref 0–0.04)
IMM GRANULOCYTES NFR BLD AUTO: 0.3 %
LYMPHOCYTES # BLD AUTO: 1.81 X10(3)/MCL (ref 0.6–4.6)
LYMPHOCYTES NFR BLD AUTO: 16.3 %
MCH RBC QN AUTO: 27.6 PG (ref 27–31)
MCHC RBC AUTO-ENTMCNC: 32.8 G/DL (ref 33–36)
MCV RBC AUTO: 84 FL (ref 80–94)
MONOCYTES # BLD AUTO: 1.23 X10(3)/MCL (ref 0.1–1.3)
MONOCYTES NFR BLD AUTO: 11.1 %
NEUTROPHILS # BLD AUTO: 7.91 X10(3)/MCL (ref 2.1–9.2)
NEUTROPHILS NFR BLD AUTO: 71.3 %
NRBC BLD AUTO-RTO: 0 %
PLATELET # BLD AUTO: 186 X10(3)/MCL (ref 130–400)
PMV BLD AUTO: 11.4 FL (ref 7.4–10.4)
POCT GLUCOSE: 107 MG/DL (ref 70–110)
POCT GLUCOSE: 133 MG/DL (ref 70–110)
POCT GLUCOSE: 160 MG/DL (ref 70–110)
POCT GLUCOSE: 162 MG/DL (ref 70–110)
POCT GLUCOSE: 96 MG/DL (ref 70–110)
POTASSIUM SERPL-SCNC: 3.4 MMOL/L (ref 3.5–5.1)
PROT SERPL-MCNC: 5.3 GM/DL (ref 5.8–7.6)
RBC # BLD AUTO: 3.99 X10(6)/MCL (ref 4.7–6.1)
SODIUM SERPL-SCNC: 136 MMOL/L (ref 136–145)
WBC # SPEC AUTO: 11.1 X10(3)/MCL (ref 4.5–11.5)

## 2024-02-17 PROCEDURE — 25000003 PHARM REV CODE 250: Performed by: INTERNAL MEDICINE

## 2024-02-17 PROCEDURE — 82550 ASSAY OF CK (CPK): CPT | Performed by: INTERNAL MEDICINE

## 2024-02-17 PROCEDURE — 63600175 PHARM REV CODE 636 W HCPCS: Performed by: INTERNAL MEDICINE

## 2024-02-17 PROCEDURE — 80053 COMPREHEN METABOLIC PANEL: CPT | Performed by: INTERNAL MEDICINE

## 2024-02-17 PROCEDURE — 86140 C-REACTIVE PROTEIN: CPT | Performed by: INTERNAL MEDICINE

## 2024-02-17 PROCEDURE — 85652 RBC SED RATE AUTOMATED: CPT | Performed by: INTERNAL MEDICINE

## 2024-02-17 PROCEDURE — 11000001 HC ACUTE MED/SURG PRIVATE ROOM

## 2024-02-17 PROCEDURE — 92610 EVALUATE SWALLOWING FUNCTION: CPT

## 2024-02-17 PROCEDURE — 85025 COMPLETE CBC W/AUTO DIFF WBC: CPT | Performed by: INTERNAL MEDICINE

## 2024-02-17 RX ORDER — POTASSIUM CHLORIDE 20 MEQ/1
40 TABLET, EXTENDED RELEASE ORAL ONCE
Status: COMPLETED | OUTPATIENT
Start: 2024-02-17 | End: 2024-02-17

## 2024-02-17 RX ADMIN — ENOXAPARIN SODIUM 40 MG: 100 INJECTION SUBCUTANEOUS at 05:02

## 2024-02-17 RX ADMIN — CIPROFLOXACIN HYDROCHLORIDE 500 MG: 500 TABLET, FILM COATED ORAL at 09:02

## 2024-02-17 RX ADMIN — AMLODIPINE BESYLATE 2.5 MG: 2.5 TABLET ORAL at 06:02

## 2024-02-17 RX ADMIN — INSULIN ASPART 2 UNITS: 100 INJECTION, SOLUTION INTRAVENOUS; SUBCUTANEOUS at 06:02

## 2024-02-17 RX ADMIN — INSULIN DETEMIR 22 UNITS: 100 INJECTION, SOLUTION SUBCUTANEOUS at 09:02

## 2024-02-17 RX ADMIN — POLYETHYLENE GLYCOL 3350 17 G: 17 POWDER, FOR SOLUTION ORAL at 10:02

## 2024-02-17 RX ADMIN — POTASSIUM CHLORIDE 40 MEQ: 1500 TABLET, EXTENDED RELEASE ORAL at 10:02

## 2024-02-17 RX ADMIN — POLYETHYLENE GLYCOL 3350 17 G: 17 POWDER, FOR SOLUTION ORAL at 09:02

## 2024-02-17 RX ADMIN — ASPIRIN 81 MG: 81 TABLET, COATED ORAL at 10:02

## 2024-02-17 RX ADMIN — CIPROFLOXACIN HYDROCHLORIDE 500 MG: 500 TABLET, FILM COATED ORAL at 10:02

## 2024-02-17 RX ADMIN — METHOCARBAMOL 500 MG: 500 TABLET ORAL at 10:02

## 2024-02-17 NOTE — PLAN OF CARE
Problem: Adult Inpatient Plan of Care  Goal: Plan of Care Review  Outcome: Ongoing, Progressing  Goal: Patient-Specific Goal (Individualized)  Outcome: Ongoing, Progressing  Goal: Absence of Hospital-Acquired Illness or Injury  Outcome: Ongoing, Progressing  Goal: Optimal Comfort and Wellbeing  Outcome: Ongoing, Progressing  Goal: Readiness for Transition of Care  Outcome: Ongoing, Progressing     Problem: Diabetes Comorbidity  Goal: Blood Glucose Level Within Targeted Range  Outcome: Ongoing, Progressing     Problem: Pain Acute  Goal: Acceptable Pain Control and Functional Ability  Outcome: Ongoing, Progressing     Problem: Fall Injury Risk  Goal: Absence of Fall and Fall-Related Injury  Outcome: Ongoing, Progressing     Problem: UTI (Urinary Tract Infection)  Goal: Improved Infection Symptoms  Outcome: Ongoing, Progressing     Problem: Infection  Goal: Absence of Infection Signs and Symptoms  Outcome: Ongoing, Progressing

## 2024-02-17 NOTE — PROGRESS NOTES
[facility] Hospital Medicine Progress Note        Chief Complaint: Inpatient Follow-up    HPI:     68-year-old male with significant history of type 2 diabetes mellitus, diabetic foot wound several years ago [facility] [employer]  Patient fell on his left side of the chest and hit his head.  No loss of consciousness.  Patient has been noncompliant with all his medications for the past 2 years due to lack of insurance.  Reported generalized weakness, polyuria and polydipsia for the past 1 week.  Patient was found to have acute kidney injury, hyperglycemia and UTI at [facility].  CT abdomen/pelvis was unremarkable. patient was transferred to [facility] for admission under hospitalist medicine service.  Unsure if he had a near syncopal episode. CT head, ultrasound carotid and echocardiogram ordered. initiated IV fluids. initiated long-acting insulin. patient also was hypertensive and therefore initiated on antihypertensives . ceftriaxone for UTI pending urine cultures .case management consulted to assist with DC disposition.  Patient has bilateral carotid artery disease with 50-69 % stenosis.  Echocardiogram with diastolic dysfunction, no valvular heart disease, EF normal.  Urine cultures-Enterococcus, treated with ciprofloxacin    Interval Hx:   No acute events reported overnight.  RN reported concerns of difficulty swallowing last night, speech therapy ordered  Patient seen at bedside, no new complaints patient, patient with chronic legs/muscle weakness. had MRI brain this morning       Objective/physical exam:  General: In no acute distress, afebrile  Chest: unlabored breathing   Heart: normal rate  Abdomen: Soft, nontender  MSK: Warm, no lower extremity edema  Neurologic: Alert and oriented , moving all extremities spontaneously some generalized weakness   VITAL SIGNS: 24 HRS MIN & MAX LAST   Temp  Min: 97.9 °F (36.6 °C)  Max: 98.5 °F (36.9  °C) 98.3 °F (36.8 °C)   BP  Min: 99/63  Max: 133/79 118/68   Pulse  Min: 75  Max: 98  75   Resp  Min: 18  Max: 20 20   SpO2  Min: 94 %  Max: 97 % 95 %       Recent Labs   Lab 02/17/24  0648   WBC 11.10   RBC 3.99*   HGB 11.0*   HCT 33.5*   MCV 84.0   MCH 27.6   MCHC 32.8*   RDW 14.2      MPV 11.4*         Recent Labs   Lab 02/11/24  0432 02/12/24  0631 02/17/24  0648      < > 136   K 3.8   < > 3.4*   CO2 21*   < > 23   BUN 19.2   < > 25.8*   CREATININE 0.83   < > 0.72*   CALCIUM 8.5*   < > 8.6*   MG 1.90  --   --    ALBUMIN 3.1*  --  2.8*   ALKPHOS 126  --  116   ALT 43  --  46   AST 24  --  31   BILITOT 0.4  --  0.4    < > = values in this interval not displayed.          Microbiology Results (last 7 days)       Procedure Component Value Units Date/Time    Urine culture [[id number]]  (Abnormal)  (Susceptibility) Collected: 02/10/24 0235    Order Status: Completed Specimen: Urine Updated: 02/14/24 1105     Urine Culture >/= 100,000 colonies/ml Enterococcus faecalis             Scheduled Med:   amLODIPine  2.5 mg Oral Daily    aspirin  81 mg Oral Daily    ciprofloxacin HCl  500 mg Oral Q12H    enoxparin  40 mg Subcutaneous Q24H (prophylaxis, 1700)    insulin detemir U-100  22 Units Subcutaneous QHS    lactulose 10 gram/15 ml  30 g Oral BID    methocarbamoL  500 mg Oral TID    polyethylene glycol  17 g Oral BID    potassium chloride  40 mEq Oral Once          Assessment/Plan:  Near syncope with fall  Bilateral carotid artery disease-50-69% stenosis  Mild left-sided weakness-rule out CVA   ? Proximal muscle weakness   Traumatic left sided rib fractures   ?Left-sided pneumonia-cap  Acute Enterococcus UTI  Sepsis secondary to both above  Poorly-controlled type 2 diabetes mellitus with hyperglycemia , A1c 11.6  New diagnosis HTN  HLD     Physical therapy evaluation reported patient requiring minimal to moderate assistance to stand from chair?  Proximal muscle weakness with concerns of difficulty swallowing,  follow speech therapy recommendation  Maintain aspiration precaution  Patient on full liquid diet currently, observe closely  ESR, CRP noted 63/17.2 elevated, CK within normal limits, AST ALT ALP within normal limits  Unclear whether patient's overall weakness is from any myopathy versus diabetic related  MRI brain without contrast reported sequela of chronic small-vessel ischemic disease without acute intracranial abnormality.  We will check HIV status,ss A/B, perez rnp antibodies  ?muscle biopsy   Probably patient may need neurology start rheumatology workup  Need to rule out any underlying malignancy  CT abdomen pemuscle biopsylvis from 02/10/2024 reported some hazy inflammatory stranding in the mesenteric root just nonspecific but can be seen in setting of mesenteric adenitis.    Hold off on statins for now ,consider resuming once figured out statins not contributing to his weakness given carotid stenosis  Continue asa, for  bilateral carotid artery disease with 50-69% stenosis on both sides .Will need close outpatient surveillance, repeat ultrasound in 6 months and follow up with vascular   Echocardiogram with diastolic dysfunction, otherwise unremarkable    MRI brain without contrast to rule out CVA -pending   Cont  p.r.n. narcotics , DC Robaxin  Patient has Enterococcus UTI, cont ciprofloxacin   MRSA PCR is negative , DC doxycycline  Cbgs control cont  Levemir 22 units q.h.s.,Continue sliding scale  A1c is more than 11 and therefore will need long and short-acting insulin upon DC  Monitor blood pressure and hold amlodipine if blood pressure systolic less than 130.  His blood pressure mostly in the range of 110s with occasional readings in 150  Continue bowel regimen  Cont therapy services   Care discussed with pt's nurse    DVT prophylaxis-on subcu Lovenox    Dispo: placement         Morena Chavez MD   02/17/2024                 Sebas Barragan (MD), Surgery  [address] [address]  Phone: [phone]  Fax: [phone] Cheek To Nose Interpolation Flap Division And Inset Text: Division and inset of the cheek to nose interpolation flap was performed to achieve optimal aesthetic result, restore normal anatomic appearance and avoid distortion of normal anatomy, expedite and facilitate wound healing, achieve optimal functional result and because linear closure either not possible or would produce suboptimal result. The patient was prepped and draped in the usual manner. The pedicle was infiltrated with local anesthesia. The pedicle was sectioned with a #15 blade. The pedicle was de-bulked and trimmed to match the shape of the defect. Hemostasis was achieved. The flap donor site and free margin of the flap were secured with deep buried sutures and the wound edges were re-approximated.

## 2024-02-17 NOTE — PT/OT/SLP EVAL
[facility]  Speech Language Pathology Department  Clinical Swallow Evaluation    Patient Name:  Silvano Perry   MRN:  [id number]    Recommendations:     General recommendations:  SLP intervention not indicated and consider GI consult given patient complaints/symptoms  Diet texture/consistency recommendations:  Soft & Bite-sized solids (IDDSI 6) and thin liquids (IDDSI 0)  Medications: per patient preference  Swallow strategies/precautions: small bites/sips and slow rate  Precautions: Standard,      History:       Past Medical History:   Diagnosis Date    Diabetes mellitus      Past Surgical History:   Procedure Laterality Date    Bilateral great toe amputations         Home diet texture/consistency: Soft & Bite-sized and thin liquids  Current method of nutrition:  Full liquids    Imaging   Results for orders placed during the hospital encounter of 02/10/24    X-Ray Chest 1 View    Narrative  EXAMINATION:  XR CHEST 1 VIEW    CPT 49796    CLINICAL HISTORY:  Fall, pain, rule out rib fracture;    COMPARISON:  October 8 2014    FINDINGS:  Examination reveals mediastinal cardiac silhouette to be within normal limits slightly more confluent opacities identified in the left retrocardiac region infiltrate cannot be completely excluded.    There is evidence of fractures of the 7th in 8 perhaps the 9th left ribs in the midaxillary line rib series might prove helpful for further assessment.    No other abnormalities    Impression  Left-sided rib fractures as above rib series might prove helpful for further assessment.    Confluent opacities in the left retrocardiac region infiltrate/atelectasis cannot be completely excluded.    Otherwise no active pulmonary disease.    This report was flagged in Epic as abnormal.      Electronically signed by: Keyon Quiros  Date:    02/15/2024  Time:    09:33    No results found for this or any previous visit.    Results for orders placed during the hospital  encounter of 02/10/24    MRI Brain Without Contrast    Narrative  EXAMINATION:  MRI BRAIN WITHOUT CONTRAST    CLINICAL HISTORY:  Rule out CVA;    TECHNIQUE:  Multiplanar multisequence MR imaging of the brain was performed without contrast.    COMPARISON:  CT of the head 02/11/2024.    FINDINGS:  There is normal brain formation.  There is mild diffuse cerebral atrophy.  There is periventricular and subcortical FLAIR hyperintensity.  There is no restricted diffusion.  There is no hemorrhage, hydrocephalus, or midline shift.  There is no cytotoxic or vasogenic edema.  There is no intra or extra-axial fluid collection.  There is no herniation.    There are normal flow voids in the bilateral carotid siphons.  The sella is normal.  The cerebellar tonsils are normally position.  There is no evidence of intracranial hypotension.  The calvarium is normal in signal intensity.  The bilateral orbits are normal.  The paranasal sinuses are free of disease.    Impression  Sequela of chronic small vessel ischemic disease without acute intracranial abnormality.      Electronically signed by: Osvaldo Turner MD  Date:    02/17/2024  Time:    09:49    Subjective     Patient awake, alert, and cooperative.    Pain/Comfort: Pain Rating 1: 0/10      Objective:     ORAL MUSCULATURE    Secretion Management: adequate  Mucosal Quality: good  Facial Movement: WFL  Buccal Strength & Mobility: WFL  Mandibular Strength & Mobility: WFL  Oral Labial Strength & Mobility: WFL  Lingual Strength & Mobility: WFL  Vocal Quality: adequate      Consistency Fed By Oral Symptoms Pharyngeal Symptoms   Thin liquid by straw Self None None   Puree SLP None None   Chewable solid SLP None Reported globus sensation at sternal notch     Assessment:     Patient requested soft and bite sized solids as that is how he prepares his food at baseline. Consider GI consult if dysphagia continues. ST to sign off at this time.    Goals:     Multidisciplinary Problems       SLP  Goals       Not on file                  Patient Education:     Patient provided with verbal education regarding ST POC.  Understanding was verbalized.    Plan:     SLP Follow-Up:  No   Plan of Care reviewed with:  patient      Time Tracking:     SLP Treatment Date:   02/17/24  Speech Start Time:  1530  Speech Stop Time:  1550     Speech Total Time (min):  20 min    Billable minutes:  Swallow and Oral Function Evaluation, 15 minutes     02/17/2024

## 2024-02-18 LAB
ALBUMIN SERPL-MCNC: 3 G/DL (ref 3.4–4.8)
ALBUMIN/GLOB SERPL: 0.9 RATIO (ref 1.1–2)
ALP SERPL-CCNC: 128 UNIT/L (ref 40–150)
ALT SERPL-CCNC: 43 UNIT/L (ref 0–55)
AST SERPL-CCNC: 36 UNIT/L (ref 5–34)
BASOPHILS # BLD AUTO: 0.08 X10(3)/MCL
BASOPHILS NFR BLD AUTO: 0.8 %
BILIRUB SERPL-MCNC: 0.5 MG/DL
BUN SERPL-MCNC: 17.2 MG/DL (ref 8.4–25.7)
CALCIUM SERPL-MCNC: 9 MG/DL (ref 8.8–10)
CHLORIDE SERPL-SCNC: 102 MMOL/L (ref 98–107)
CO2 SERPL-SCNC: 27 MMOL/L (ref 23–31)
CREAT SERPL-MCNC: 0.83 MG/DL (ref 0.73–1.18)
EOSINOPHIL # BLD AUTO: 0.1 X10(3)/MCL (ref 0–0.9)
EOSINOPHIL NFR BLD AUTO: 1 %
ERYTHROCYTE [DISTWIDTH] IN BLOOD BY AUTOMATED COUNT: 14.1 % (ref 11.5–17)
GFR SERPLBLD CREATININE-BSD FMLA CKD-EPI: >60 MLS/MIN/1.73/M2
GLOBULIN SER-MCNC: 3.3 GM/DL (ref 2.4–3.5)
GLUCOSE SERPL-MCNC: 161 MG/DL (ref 82–115)
HCT VFR BLD AUTO: 40 % (ref 42–52)
HGB BLD-MCNC: 12.5 G/DL (ref 14–18)
HIV 1+2 AB+HIV1 P24 AG SERPL QL IA: NONREACTIVE
IMM GRANULOCYTES # BLD AUTO: 0.03 X10(3)/MCL (ref 0–0.04)
IMM GRANULOCYTES NFR BLD AUTO: 0.3 %
LYMPHOCYTES # BLD AUTO: 1.47 X10(3)/MCL (ref 0.6–4.6)
LYMPHOCYTES NFR BLD AUTO: 14.6 %
MCH RBC QN AUTO: 27.6 PG (ref 27–31)
MCHC RBC AUTO-ENTMCNC: 31.3 G/DL (ref 33–36)
MCV RBC AUTO: 88.3 FL (ref 80–94)
MONOCYTES # BLD AUTO: 0.91 X10(3)/MCL (ref 0.1–1.3)
MONOCYTES NFR BLD AUTO: 9 %
NEUTROPHILS # BLD AUTO: 7.47 X10(3)/MCL (ref 2.1–9.2)
NEUTROPHILS NFR BLD AUTO: 74.3 %
NRBC BLD AUTO-RTO: 0 %
PLATELET # BLD AUTO: 231 X10(3)/MCL (ref 130–400)
PMV BLD AUTO: 11 FL (ref 7.4–10.4)
POCT GLUCOSE: 135 MG/DL (ref 70–110)
POCT GLUCOSE: 161 MG/DL (ref 70–110)
POCT GLUCOSE: 194 MG/DL (ref 70–110)
POCT GLUCOSE: 96 MG/DL (ref 70–110)
POTASSIUM SERPL-SCNC: 3.8 MMOL/L (ref 3.5–5.1)
PROT SERPL-MCNC: 6.3 GM/DL (ref 5.8–7.6)
RBC # BLD AUTO: 4.53 X10(6)/MCL (ref 4.7–6.1)
SODIUM SERPL-SCNC: 138 MMOL/L (ref 136–145)
WBC # SPEC AUTO: 10.06 X10(3)/MCL (ref 4.5–11.5)

## 2024-02-18 PROCEDURE — 11000001 HC ACUTE MED/SURG PRIVATE ROOM

## 2024-02-18 PROCEDURE — 86200 CCP ANTIBODY: CPT | Performed by: STUDENT IN AN ORGANIZED HEALTH CARE EDUCATION/TRAINING PROGRAM

## 2024-02-18 PROCEDURE — 63600175 PHARM REV CODE 636 W HCPCS: Performed by: INTERNAL MEDICINE

## 2024-02-18 PROCEDURE — 86235 NUCLEAR ANTIGEN ANTIBODY: CPT | Performed by: INTERNAL MEDICINE

## 2024-02-18 PROCEDURE — 25000003 PHARM REV CODE 250: Performed by: INTERNAL MEDICINE

## 2024-02-18 PROCEDURE — 86039 ANTINUCLEAR ANTIBODIES (ANA): CPT | Performed by: STUDENT IN AN ORGANIZED HEALTH CARE EDUCATION/TRAINING PROGRAM

## 2024-02-18 PROCEDURE — 87389 HIV-1 AG W/HIV-1&-2 AB AG IA: CPT | Performed by: INTERNAL MEDICINE

## 2024-02-18 PROCEDURE — 80053 COMPREHEN METABOLIC PANEL: CPT | Performed by: STUDENT IN AN ORGANIZED HEALTH CARE EDUCATION/TRAINING PROGRAM

## 2024-02-18 PROCEDURE — 85025 COMPLETE CBC W/AUTO DIFF WBC: CPT | Performed by: STUDENT IN AN ORGANIZED HEALTH CARE EDUCATION/TRAINING PROGRAM

## 2024-02-18 RX ADMIN — POLYETHYLENE GLYCOL 3350 17 G: 17 POWDER, FOR SOLUTION ORAL at 09:02

## 2024-02-18 RX ADMIN — INSULIN ASPART 2 UNITS: 100 INJECTION, SOLUTION INTRAVENOUS; SUBCUTANEOUS at 09:02

## 2024-02-18 RX ADMIN — CIPROFLOXACIN HYDROCHLORIDE 500 MG: 500 TABLET, FILM COATED ORAL at 10:02

## 2024-02-18 RX ADMIN — POLYETHYLENE GLYCOL 3350 17 G: 17 POWDER, FOR SOLUTION ORAL at 10:02

## 2024-02-18 RX ADMIN — AMLODIPINE BESYLATE 2.5 MG: 2.5 TABLET ORAL at 10:02

## 2024-02-18 RX ADMIN — INSULIN ASPART 2 UNITS: 100 INJECTION, SOLUTION INTRAVENOUS; SUBCUTANEOUS at 06:02

## 2024-02-18 RX ADMIN — ENOXAPARIN SODIUM 40 MG: 100 INJECTION SUBCUTANEOUS at 05:02

## 2024-02-18 RX ADMIN — INSULIN DETEMIR 22 UNITS: 100 INJECTION, SOLUTION SUBCUTANEOUS at 09:02

## 2024-02-18 RX ADMIN — CIPROFLOXACIN HYDROCHLORIDE 500 MG: 500 TABLET, FILM COATED ORAL at 09:02

## 2024-02-18 RX ADMIN — ASPIRIN 81 MG: 81 TABLET, COATED ORAL at 10:02

## 2024-02-18 NOTE — PROGRESS NOTES
[facility] Hospital Medicine Progress Note        Chief Complaint: Inpatient Follow-up    HPI:   68-year-old male with significant history of type 2 diabetes mellitus, diabetic foot [facility] several [facility] [employer]  Patient fell on his left side of the chest and hit his head.  No loss of consciousness.  Patient has been noncompliant with all his medications for the past 2 years due to lack of insurance.  Reported generalized weakness, polyuria and polydipsia for the past 1 week.  Patient was found to have acute kidney injury, hyperglycemia and UTI at [facility] facility.  CT abdomen/pelvis was unremarkable. Patient was transferred to [facility] for admission under hospitalist medicine service.  CXR showed left-sided rib fractures with confluence opacities in the left retrocardiac region indicative of possible infiltrate/atelectasis.  Unsure if he had a near syncopal episode. CT head, ultrasound carotid and echocardiogram ordered. initiated IV fluids. initiated long-acting insulin. patient also was hypertensive and therefore initiated on antihypertensives . ceftriaxone for UTI pending urine cultures .case management consulted to assist with DC disposition.  Patient has bilateral carotid artery disease with 50-69 % stenosis.  Echocardiogram with diastolic dysfunction, no valvular heart disease, EF 65%.  Urine cultures-Enterococcus, treated with ciprofloxacin. Speech therapy consulted for dysphagia which patient reported; placed patient on soft and bite size solids and liquids. MRI Brain showed sequelae of chronic small-vessel ischemic disease without acute intracranial abnormalities.  PT/OT consulted; recommending moderate intensity therapy. CM to assist with SNF placement. ESR/CRP noted to be elevated. Autoimmune workup ordered given patient's proximal muscle weakness & dysphagia.    Interval Hx:   Today, Mr. Perry stated he was doing well  and had no new complaints. Will await AI w/u. Continues to work with PT. Awaiting SNF placement.    Objective/physical exam:  General: alert male lying comfortably in bed, in no acute distress  HENT: oral and oropharyngeal mucosa moist, pink, with no erythema or exudates, no ear pain or discharge  Neck: normal neck movement, no lymph nodes or swellings, no JVD or Carotid bruit  Respiratory: clear breathing sounds bilaterally, no crackles, rales, ronchi or wheezes  Cardiovascular: clear S1 and S2, no murmurs, rubs or gallops  Peripheral Vascular: no lesions, ulcers or erosions, normal peripheral pulses and no pedal edema  Gastrointestinal: soft, non-tender, non-distended abdomen, no guarding, rigidity or rebound tenderness, normal bowel sounds  Integumentary: normal skin color, no rashes or lesions  Neuro: AAO x 3; motor strength 5/5 in B/L UEs & LEs; sensation intact to gross and fine touch B/L; CN II-XII grossly intact    VITAL SIGNS: 24 HRS MIN & MAX LAST   Temp  Min: 97.6 °F (36.4 °C)  Max: 97.9 °F (36.6 °C) 97.8 °F (36.6 °C)   BP  Min: 123/77  Max: 143/74 137/68   Pulse  Min: 70  Max: 80  80   Resp  Min: 18  Max: 20 20   SpO2  Min: 94 %  Max: 97 % 97 %       Recent Labs   Lab 02/17/24  0648   WBC 11.10   RBC 3.99*   HGB 11.0*   HCT 33.5*   MCV 84.0   MCH 27.6   MCHC 32.8*   RDW 14.2      MPV 11.4*           Recent Labs   Lab 02/17/24  0648      K 3.4*   CO2 23   BUN 25.8*   CREATININE 0.72*   CALCIUM 8.6*   ALBUMIN 2.8*   ALKPHOS 116   ALT 46   AST 31   BILITOT 0.4       Assessment/Plan:  Near syncope with fall  Bilateral carotid artery disease-50-69% stenosis  Mild left-sided weakness-rule out CVA   Proximal muscle weakness 2/2 possible Myositis  Elevated ESR/CRP  Traumatic left sided rib fractures   Left-sided Pneumonia  Acute Enterococcal UTI  Sepsis secondary to Above  DM II  HTN  HLD     Patient continues to be admitted   Reporting no new complaints   Continues to have proximal muscle weakness  with difficulty standing from a chair  PT/OT on board; following recommendations  Cm on board for SNF placement   Autoimmune workup ordered and pending; SSA, SSB, Salud 1, anti Smith, anti RNP, RENU, RF antibodies pending  Speech therapy on board; follow recommendations   Patient placed on modified diet   Continued on amlodipine 2.5 mg daily, aspirin 81 mg, detemir 22 units nightly, lactulose 30 g b.i.d., MiraLax 17 g b.i.d.  Continued on ciprofloxacin 500 mg b.i.d.  ISS LD  Continue monitoring patient's symptoms    DVT prophylaxis-on subcu Lovenox    Dispo: SNF placement       Deion Morris MD   02/18/2024

## 2024-02-19 LAB
CYCLIC CITRULLINATED PEPTIDE (CCP) (OHS): <0.4 U/ML
JO-1 AB QUANT (OHS): <0.3 U/ML
POCT GLUCOSE: 118 MG/DL (ref 70–110)
POCT GLUCOSE: 148 MG/DL (ref 70–110)
POCT GLUCOSE: 258 MG/DL (ref 70–110)
POCT GLUCOSE: 74 MG/DL (ref 70–110)
RHEUMATOID FACTOR IGA QUANTITATIVE (OLG): 38 IU/ML
RHEUMATOID FACTOR IGM QUANTITATIVE (OLG): 0.6 IU/ML
SSA(RO) AB QUANT (OHS): <0.4 U/ML
SSB(LA) AB QUANT (OHS): <0.4 U/ML

## 2024-02-19 PROCEDURE — 97116 GAIT TRAINING THERAPY: CPT | Mod: CQ

## 2024-02-19 PROCEDURE — 97110 THERAPEUTIC EXERCISES: CPT | Mod: CQ

## 2024-02-19 PROCEDURE — 25000003 PHARM REV CODE 250: Performed by: INTERNAL MEDICINE

## 2024-02-19 PROCEDURE — 63600175 PHARM REV CODE 636 W HCPCS: Performed by: INTERNAL MEDICINE

## 2024-02-19 PROCEDURE — 97535 SELF CARE MNGMENT TRAINING: CPT

## 2024-02-19 PROCEDURE — 11000001 HC ACUTE MED/SURG PRIVATE ROOM

## 2024-02-19 RX ADMIN — INSULIN DETEMIR 22 UNITS: 100 INJECTION, SOLUTION SUBCUTANEOUS at 08:02

## 2024-02-19 RX ADMIN — POLYETHYLENE GLYCOL 3350 17 G: 17 POWDER, FOR SOLUTION ORAL at 09:02

## 2024-02-19 RX ADMIN — CIPROFLOXACIN HYDROCHLORIDE 500 MG: 500 TABLET, FILM COATED ORAL at 08:02

## 2024-02-19 RX ADMIN — ENOXAPARIN SODIUM 40 MG: 100 INJECTION SUBCUTANEOUS at 06:02

## 2024-02-19 RX ADMIN — CIPROFLOXACIN HYDROCHLORIDE 500 MG: 500 TABLET, FILM COATED ORAL at 09:02

## 2024-02-19 RX ADMIN — ASPIRIN 81 MG: 81 TABLET, COATED ORAL at 09:02

## 2024-02-19 RX ADMIN — POLYETHYLENE GLYCOL 3350 17 G: 17 POWDER, FOR SOLUTION ORAL at 08:02

## 2024-02-19 RX ADMIN — AMLODIPINE BESYLATE 2.5 MG: 2.5 TABLET ORAL at 09:02

## 2024-02-19 NOTE — PT/OT/SLP PROGRESS
Occupational Therapy   Treatment    Name: Silvano Perry  MRN: [id number]  Admitting Diagnosis:  Uncontrolled type 2 diabetes mellitus with hyperglycemia       Recommendations:     Recommended therapy intensity at discharge: Moderate Intensity Therapy   Discharge Equipment Recommendations:  grab bar  Barriers to discharge:   (impaired mobility)    Assessment:     Silvano Perry is a 68 y.o. male with a medical diagnosis of Uncontrolled type 2 diabetes mellitus with hyperglycemia.  He presents with good motivation and effort. Performance deficits affecting function are weakness, impaired functional mobility, impaired self care skills, impaired endurance.     Rehab Prognosis:  Good; patient would benefit from acute skilled OT services to address these deficits and reach maximum level of function.       Plan:     Patient to be seen 4 x/week to address the above listed problems via self-care/home management, therapeutic activities, therapeutic exercises  Plan of Care Expires: 03/11/24  Plan of Care Reviewed with: patient    Subjective     Pain/Comfort:       Objective:     Communicated with: RN prior to session.  Patient found up in chair with  peripheral IV upon OT entry to room.    General Precautions: Standard, fall    Orthopedic Precautions:N/A  Braces: N/A  Respiratory Status: Room air    Functional Mobility/Transfers:  Patient completed Sit <> Stand Transfer with minimum assistance  with  rolling walker   Functional Mobility: ambulated with RW and CGA to sink in room for grooming, oral care. SBA for standing balance at sink.     Activities of Daily Living:  Grooming: stand by assistance standing balance at sink to complete oral care  Lower Body Dressing: stand by assistance      Therapeutic Positioning    OT interventions performed during the course of today's session in an effort to prevent and/or reduce acquired pressure injuries:   Therapeutic positioning was provided at the conclusion of session to offload all bony  prominences for the prevention and/or reduction of pressure injuries    Patient Education:  Patient provided with verbal education education regarding OT role/goals/POC, fall prevention, and safety awareness.  Understanding was verbalized, however additional teaching warranted.      Patient left up in chair with all lines intact and call button in reach    GOALS:   Multidisciplinary Problems       Occupational Therapy Goals          Problem: Occupational Therapy    Goal Priority Disciplines Outcome Interventions   Occupational Therapy Goal     OT, PT/OT Ongoing, Progressing    Description: LTG: Pt will perform basic ADLs and ADL transfers with Modified independence using LRAD by discharge.    STG: to be met by 3/11/2024    Pt will complete grooming standing at sink with LRAD with SBA.  Pt will complete UB dressing with SBA.  Pt will complete LB dressing with SBA using LRAD.  Pt will complete toileting with SBA using LRAD.  Pt will complete functional mobility to/from toilet and toilet transfer with SBA using LRAD.                         Time Tracking:     OT Date of Treatment: 02/19/24  OT Start Time: 1145  OT Stop Time: 1157  OT Total Time (min): 12 min    Billable Minutes:Self Care/Home Management 12    OT/FLOWER: OT     Number of FLOWER visits since last OT visit: 2    2/19/2024

## 2024-02-19 NOTE — PT/OT/SLP PROGRESS
Physical Therapy Treatment    Patient Name:  Silvano Perry   MRN:  [id number]    Recommendations:     Discharge therapy intensity: Moderate Intensity Therapy   Discharge Equipment Recommendations: to be determined by next level of care  Barriers to discharge: Decreased caregiver support and Impaired mobility    Assessment:     Silvano Perry is a 68 y.o. male admitted with a medical diagnosis of T2DM.  He presents with the following impairments/functional limitations: weakness, impaired endurance, gait instability, impaired balance, decreased safety awareness, pain .    Pt still currently min-modA to stand from chair. Pt states his surfaces at home are low like the chair.  Pt states he has no one to assist him at home. Rec placement at this time unless pt progresses during hospital stay. NSG aware.     Rehab Prognosis: Good; patient would benefit from acute skilled PT services to address these deficits and reach maximum level of function.    Recent Surgery: * No surgery found *      Plan:     During this hospitalization, patient to be seen 5 x/week to address the identified rehab impairments via gait training, therapeutic activities, therapeutic exercises, neuromuscular re-education and progress toward the following goals:    Plan of Care Expires:  03/11/24    Subjective     Chief Complaint:   Patient/Family Comments/goals:   Pain/Comfort:  Pain Rating 1: 0/10      Objective:     Communicated with NSG prior to session.  Patient found HOB elevated with telemetry upon PT entry to room.     General Precautions: Standard, fall  Orthopedic Precautions: N/A  Braces: N/A  Respiratory Status: Room air  Blood Pressure:   Skin Integrity: Visible skin intact      Functional Mobility:  Bed Mobility:     Scooting: stand by assistance  Supine to Sit: stand by assistance  Transfers:     Sit to Stand:  minimum assistance and moderate assistance with rolling walker and 5 trials from bedside chair (Minoo), 1 trial from EOB (modA). Pt  required vcs for increased rocking for momentum to assist with standing. Once pt able to complete this his sit to stands improved.    Gait: pt [id number] with RW CGA. Pt with step-to gait pattern and unsteady gait. No major LOB.     BLE AROM: ankle pumps, knee flex/ext, alt marching, hip add/abd. 15 reps BLE      Patient left up in chair with call button in reach..    GOALS:   Multidisciplinary Problems       Physical Therapy Goals          Problem: Physical Therapy    Goal Priority Disciplines Outcome Goal Variances Interventions   Physical Therapy Goal     PT, PT/OT Ongoing, Progressing     Description: Goals to be met by: 3/11/24     Patient will increase functional independence with mobility by performin. Supine to sit with Whiteside  2. Sit to supine with Whiteside  3. Sit to stand transfer with Modified Wh[place]ide  4. Gait  x 300 feet with Modified Wh[place]ide using Rolling Walker.                          Time Tracking:     PT Received On: 24  PT Start Time: 0940     PT Stop Time: 1003  PT Total Time (min): 23 min     Billable Minutes: Gait Training 15 and Therapeutic Exercise 8    Treatment Type: Treatment  PT/PTA: PTA     Number of PTA visits since last PT visit: 4     2024

## 2024-02-19 NOTE — PROGRESS NOTES
[facility] Hospital Medicine Progress Note        Chief Complaint: Inpatient Follow-up    HPI:   68-year-old male with significant history of type 2 diabetes mellitus, diabetic foot wound several years ago [facility] [employer]  Patient fell on his left side of the chest and hit his head.  No loss of consciousness.  Patient has been noncompliant with all his medications for the past 2 years due to lack of insurance.  Reported generalized weakness, polyuria and polydipsia for the past 1 week.  Patient was found to have acute kidney injury, hyperglycemia and UTI at [facility] facility.  CT abdomen/pelvis was unremarkable. Patient was transferred to [facility] for admission under hospitalist medicine service.  CXR showed left-sided rib fractures with confluence opacities in the left retrocardiac region indicative of possible infiltrate/atelectasis.  Unsure if he had a near syncopal episode. CT head, ultrasound carotid and echocardiogram ordered. initiated IV fluids. initiated long-acting insulin. patient also was hypertensive and therefore initiated on antihypertensives . ceftriaxone for UTI pending urine cultures .case management consulted to assist with DC disposition.  Patient has bilateral carotid artery disease with 50-69 % stenosis.  Echocardiogram with diastolic dysfunction, no valvular heart disease, EF 65%.  Urine cultures-Enterococcus, treated with ciprofloxacin. Speech therapy consulted for dysphagia which patient reported; placed patient on soft and bite size solids and liquids. MRI Brain showed sequelae of chronic small-vessel ischemic disease without acute intracranial abnormalities.  PT/OT consulted; recommending moderate intensity therapy. CM to assist with SNF placement. ESR/CRP noted to be elevated. Autoimmune workup ordered given patient's proximal muscle weakness & dysphagia.    Interval Hx:   Today, Mr. Perry stated he was doing well  and had no new complaints. Tolerating PO intake well & having BMs. Awaiting SNF placement.    Objective/physical exam:  General: alert male lying comfortably in bed, in no acute distress  HENT: oral and oropharyngeal mucosa moist, pink, with no erythema or exudates, no ear pain or discharge  Neck: normal neck movement, no lymph nodes or swellings, no JVD or Carotid bruit  Respiratory: clear breathing sounds bilaterally, no crackles, rales, ronchi or wheezes  Cardiovascular: clear S1 and S2, no murmurs, rubs or gallops  Peripheral Vascular: no lesions, ulcers or erosions, normal peripheral pulses and no pedal edema  Gastrointestinal: soft, non-tender, non-distended abdomen, no guarding, rigidity or rebound tenderness, normal bowel sounds  Integumentary: normal skin color, no rashes or lesions  Neuro: AAO x 3; motor strength 5/5 in B/L UEs & LEs; sensation intact to gross and fine touch B/L; CN II-XII grossly intact    VITAL SIGNS: 24 HRS MIN & MAX LAST   Temp  Min: 97.6 °F (36.4 °C)  Max: 99 °F (37.2 °C) 97.8 °F (36.6 °C)   BP  Min: 108/69  Max: 149/80 123/72   Pulse  Min: 73  Max: 79  78   Resp  Min: 17  Max: 18 18   SpO2  Min: 96 %  Max: 98 % 98 %     Recent Labs   Lab 02/18/24  1218   WBC 10.06   RBC 4.53*   HGB 12.5*   HCT 40.0*   MCV 88.3   MCH 27.6   MCHC 31.3*   RDW 14.1      MPV 11.0*       Recent Labs   Lab 02/18/24  1218      K 3.8   CO2 27   BUN 17.2   CREATININE 0.83   CALCIUM 9.0   ALBUMIN 3.0*   ALKPHOS 128   ALT 43   AST 36*   BILITOT 0.5       Assessment/Plan:  Near syncope with fall  Bilateral carotid artery disease-50-69% stenosis  Mild left-sided weakness-rule out CVA   Proximal muscle weakness 2/2 possible Myositis  Elevated ESR/CRP  Traumatic left sided rib fractures   Left-sided Pneumonia  Acute Enterococcal UTI  Sepsis secondary to Above  DM II  HTN  HLD     Patient continues to be admitted   Reporting no new complaints   Continues to have proximal muscle weakness with difficulty  standing from seated position  PT/OT on board; following recommendations  CM on board for SNF placement   Autoimmune workup ordered and pending; SSA, SSB, Salud 1, anti Smith, anti RNP, RENU, RF antibodies pending  Speech therapy on board; follow recommendations   Patient placed on modified diet; tolerating well  Continued on amlodipine 2.5 mg daily, aspirin 81 mg, detemir 22 units nightly, lactulose 30 g b.i.d., MiraLax 17 g b.i.d.  Continued on ciprofloxacin 500 mg b.i.d.  ISS LD  Continue monitoring patient's symptoms    DVT prophylaxis:Lovenox    Dispo: SNF placement       Deion Morris MD   02/19/2024

## 2024-02-20 LAB
ANA SER QL HEP2 SUBST: NORMAL
POCT GLUCOSE: 146 MG/DL (ref 70–110)
POCT GLUCOSE: 245 MG/DL (ref 70–110)

## 2024-02-20 PROCEDURE — 11000001 HC ACUTE MED/SURG PRIVATE ROOM

## 2024-02-20 PROCEDURE — 97116 GAIT TRAINING THERAPY: CPT | Mod: CQ

## 2024-02-20 PROCEDURE — 63600175 PHARM REV CODE 636 W HCPCS: Performed by: INTERNAL MEDICINE

## 2024-02-20 PROCEDURE — 25000003 PHARM REV CODE 250: Performed by: INTERNAL MEDICINE

## 2024-02-20 PROCEDURE — 97535 SELF CARE MNGMENT TRAINING: CPT | Mod: CO

## 2024-02-20 PROCEDURE — 97530 THERAPEUTIC ACTIVITIES: CPT | Mod: CQ

## 2024-02-20 RX ADMIN — AMLODIPINE BESYLATE 2.5 MG: 2.5 TABLET ORAL at 10:02

## 2024-02-20 RX ADMIN — INSULIN ASPART 4 UNITS: 100 INJECTION, SOLUTION INTRAVENOUS; SUBCUTANEOUS at 08:02

## 2024-02-20 RX ADMIN — ENOXAPARIN SODIUM 40 MG: 100 INJECTION SUBCUTANEOUS at 05:02

## 2024-02-20 RX ADMIN — ASPIRIN 81 MG: 81 TABLET, COATED ORAL at 10:02

## 2024-02-20 RX ADMIN — INSULIN DETEMIR 22 UNITS: 100 INJECTION, SOLUTION SUBCUTANEOUS at 08:02

## 2024-02-20 RX ADMIN — POLYETHYLENE GLYCOL 3350 17 G: 17 POWDER, FOR SOLUTION ORAL at 10:02

## 2024-02-20 NOTE — PLAN OF CARE
Per Pterapy: hey so 1064 has progressed well with PT/OT and can stand from multiple surfaces on his own now. we believe he is safe to d/c home with HH therapy if possible. he already has a RW he said as well as a friend who will check on him every other day.   2:51 PM  HT

## 2024-02-20 NOTE — PT/OT/SLP PROGRESS
Physical Therapy Treatment    Patient Name:  Silvano Perry   MRN:  [id number]    Recommendations:     Discharge therapy intensity: Low Intensity Therapy   Discharge Equipment Recommendations: none  Barriers to discharge: Decreased caregiver support and Impaired mobility    Assessment:     Silvano Perry is a 68 y.o. male admitted with a medical diagnosis of T2DM.  He presents with the following impairments/functional limitations: weakness, impaired endurance, gait instability, impaired balance, decreased safety awareness, pain .    Pt able to stand from chair and EOB SBA today. Pt feels he is now mobilizing well enough to d/c home and not wait on placement. Pt states he already has RW and has a friend who will stop by and check on him often. CM and NSG aware.     Rehab Prognosis: Good; patient would benefit from acute skilled PT services to address these deficits and reach maximum level of function.    Recent Surgery: * No surgery found *      Plan:     During this hospitalization, patient to be seen 5 x/week to address the identified rehab impairments via gait training, therapeutic activities, therapeutic exercises, neuromuscular re-education and progress toward the following goals:    Plan of Care Expires:  03/11/24    Subjective     Chief Complaint:   Patient/Family Comments/goals:   Pain/Comfort:  Pain Rating 1: 0/10      Objective:     Communicated with NSG prior to session.  Patient found up in chair with telemetry upon PT entry to room.     General Precautions: Standard, fall  Orthopedic Precautions: N/A  Braces: N/A  Respiratory Status: Room air  Blood Pressure:   Skin Integrity: Visible skin intact      Functional Mobility:  Bed Mobility:     Sit to Supine: stand by assistance  Transfers:     Sit to Stand:  stand by assistance with rolling walker and 2 trials from bedside chair, 2 trials from EOB. Pt stands better once shoes donned   Gait: pt amb 200ft with RW CGA. Pt with step-through gait pattern and unsteady  gait. No major LOB.       Patient left HOB elevated with call button in reach..    GOALS:   Multidisciplinary Problems       Physical Therapy Goals          Problem: Physical Therapy    Goal Priority Disciplines Outcome Goal Variances Interventions   Physical Therapy Goal     PT, PT/OT Ongoing, Progressing     Description: Goals to be met by: 3/11/24     Patient will increase functional independence with mobility by performin. Supine to sit with [place]  2. Sit to supine with [place]  3. Sit to stand transfer with Modified [place]  4. Gait  x 300 feet with Modified [place] using Rolling Walker.                          Time Tracking:     PT Received On: 24  PT Start Time: 1357     PT Stop Time: 1420  PT Total Time (min): 23 min     Billable Minutes: Gait Training 15 and Therapeutic Activity 8    Treatment Type: Treatment  PT/PTA: PTA     Number of PTA visits since last PT visit: 5     2024    WDL

## 2024-02-20 NOTE — PROGRESS NOTES
[facility] Hospital Medicine Progress Note        Chief Complaint: Inpatient Follow-up    HPI:   68-year-old male with significant history of type 2 diabetes mellitus, diabetic foot [facility] several [facility] [employer]  Patient fell on his left side of the chest and hit his head.  No loss of consciousness.  Patient has been noncompliant with all his medications for the past 2 years due to lack of insurance.  Reported generalized weakness, polyuria and polydipsia for the past 1 week.  Patient was found to have acute kidney injury, hyperglycemia and UTI at [facility] facility.  CT abdomen/pelvis was unremarkable. Patient was transferred to [facility] for admission under hospitalist medicine service.  CXR showed left-sided rib fractures with confluence opacities in the left retrocardiac region indicative of possible infiltrate/atelectasis.  Unsure if he had a near syncopal episode. CT head, ultrasound carotid and echocardiogram ordered. initiated IV fluids. initiated long-acting insulin. patient also was hypertensive and therefore initiated on antihypertensives . ceftriaxone for UTI pending urine cultures .case management consulted to assist with DC disposition.  Patient has bilateral carotid artery disease with 50-69 % stenosis.  Echocardiogram with diastolic dysfunction, no valvular heart disease, EF 65%.  Urine cultures-Enterococcus, treated with ciprofloxacin. Speech therapy consulted for dysphagia which patient reported; placed patient on soft and bite size solids and liquids. MRI Brain showed sequelae of chronic small-vessel ischemic disease without acute intracranial abnormalities.  PT/OT consulted; recommending moderate intensity therapy. CM to assist with SNF placement. ESR/CRP noted to be elevated. Autoimmune workup ordered given patient's proximal muscle weakness & dysphagia.    Interval Hx:   Today, Mr. Perry stated he was doing well  and had no new complaints. Working with PT. Awaiting SNF placement.    Objective/physical exam:  General: alert male lying comfortably in bed, in no acute distress  HENT: oral and oropharyngeal mucosa moist, pink, with no erythema or exudates, no ear pain or discharge  Neck: normal neck movement, no lymph nodes or swellings, no JVD or Carotid bruit  Respiratory: clear breathing sounds bilaterally, no crackles, rales, ronchi or wheezes  Cardiovascular: clear S1 and S2, no murmurs, rubs or gallops  Peripheral Vascular: no lesions, ulcers or erosions, normal peripheral pulses and no pedal edema  Gastrointestinal: soft, non-tender, non-distended abdomen, no guarding, rigidity or rebound tenderness, normal bowel sounds  Integumentary: normal skin color, no rashes or lesions  Neuro: AAO x 3; motor strength 5/5 in B/L UEs & LEs; sensation intact to gross and fine touch B/L; CN II-XII grossly intact    VITAL SIGNS: 24 HRS MIN & MAX LAST   Temp  Min: 97.8 °F (36.6 °C)  Max: 98.3 °F (36.8 °C) 98 °F (36.7 °C)   BP  Min: 116/67  Max: 152/73 127/72   Pulse  Min: 69  Max: 78  77   Resp  Min: 17  Max: 20 17   SpO2  Min: 95 %  Max: 98 % 95 %     Recent Labs   Lab 02/18/24  1218   WBC 10.06   RBC 4.53*   HGB 12.5*   HCT 40.0*   MCV 88.3   MCH 27.6   MCHC 31.3*   RDW 14.1      MPV 11.0*       Recent Labs   Lab 02/18/24  1218      K 3.8   CO2 27   BUN 17.2   CREATININE 0.83   CALCIUM 9.0   ALBUMIN 3.0*   ALKPHOS 128   ALT 43   AST 36*   BILITOT 0.5       Assessment/Plan:  Near syncope with fall  Bilateral carotid artery disease-50-69% stenosis  Mild left-sided weakness-rule out CVA   Proximal muscle weakness 2/2 possible Myositis  Elevated ESR/CRP  Traumatic left sided rib fractures   Left-sided Pneumonia  Acute Enterococcal UTI  Sepsis secondary to Above  DM II  HTN  HLD     Patient continues to be admitted   Reporting no new complaints   Continues to have proximal muscle weakness with difficulty standing from seated  position  PT/OT on board; following recommendations  CM on board for SNF placement   Autoimmune workup ordered; SSA, SSB, Salud 1 negative; anti Parkinson, anti RNP, RENU antibodies pending  Speech therapy on board; follow recommendations   Patient placed on modified diet; tolerating well  Continued on amlodipine 2.5 mg daily, aspirin 81 mg, detemir 22 units nightly, lactulose 30 g b.i.d., MiraLax 17 g b.i.d.  Continued on ciprofloxacin 500 mg b.i.d.  ISS LD  Continue monitoring patient's symptoms    DVT prophylaxis:Lovenox    Dispo: SNF placement       Deion Morris MD   02/20/2024

## 2024-02-20 NOTE — PT/OT/SLP PROGRESS
Occupational Therapy   Treatment    Name: Silvano Perry  MRN: [id number]  Admitting Diagnosis:  Uncontrolled type 2 diabetes mellitus with hyperglycemia       Recommendations:     Recommended therapy intensity at discharge: Low Intensity Therapy   Discharge Equipment Recommendations:  grab bar  Barriers to discharge:       Assessment:     Silvano Perry is a 68 y.o. male with a medical diagnosis of Uncontrolled type 2 diabetes mellitus with hyperglycemia. Performance deficits affecting function are weakness, impaired endurance, impaired self care skills, impaired functional mobility, impaired balance, gait instability, decreased safety awareness. Pt has progressed well with therapy. Now recommending low intensity therapy. Pt states he will have assist from friend.     Rehab Prognosis:  Good; patient would benefit from acute skilled OT services to address these deficits and reach maximum level of function.       Plan:     Patient to be seen 4 x/week to address the above listed problems via self-care/home management, therapeutic activities, therapeutic exercises  Plan of Care Expires: 03/11/24  Plan of Care Reviewed with: patient    Subjective     Pain/Comfort:  Pain Rating 1: 0/10    Objective:     Communicated with: RN prior to session.  Patient found supine with telemetry upon OT entry to room.    General Precautions: Standard, fall    Orthopedic Precautions:N/A  Braces: N/A  Respiratory Status: Room air     Occupational Performance:     Bed Mobility:    Patient completed Supine to Sit with stand by assistance     Functional Mobility/Transfers:  Performed sit<>stand t/f from chair x10 trials with varying Min-CGA. Began to fatigue towards end requiring more assist.   Functional Mobility: walked to bathroom with CGA and RW.     Activities of Daily Living:  Toileting: performed toilet t/f with CGA. Completed hygiene while seated with SBA.     Therapeutic Positioning    OT interventions performed during the course of  today's session in an effort to prevent and/or reduce acquired pressure injuries:   Education was provided on benefits of and recommendations for therapeutic positioning    Skin assessment: all bony prominences were assessed    Findings: no redness or breakdown noted    [facility] 6 Click ADL:      Patient Education:  Patient provided with verbal education education regarding OT role/goals/POC, fall prevention, and safety awareness.  Understanding was verbalized, however additional teaching warranted.      Patient left up in chair with all lines intact and call button in reach    GOALS:   Multidisciplinary Problems       Occupational Therapy Goals          Problem: Occupational Therapy    Goal Priority Disciplines Outcome Interventions   Occupational Therapy Goal     OT, PT/OT Ongoing, Progressing    Description: LTG: Pt will perform basic ADLs and ADL transfers with Modified independence using LRAD by discharge.    STG: to be met by 3/11/2024    Pt will complete grooming standing at sink with LRAD with SBA.  Pt will complete UB dressing with SBA.  Pt will complete LB dressing with SBA using LRAD.  Pt will complete toileting with SBA using LRAD.  Pt will complete functional mobility to/from toilet and toilet transfer with SBA using LRAD.                         Time Tracking:     OT Date of Treatment: 02/20/24  OT Start Time: 0815  OT Stop Time: 0830  OT Total Time (min): 15 min    Billable Minutes:Self Care/Home Management 15    OT/FLOWER: FLOWER     Number of FLOWER visits since last OT visit: 3    2/20/2024

## 2024-02-20 NOTE — PLAN OF CARE
Spoke to pt about therapy/functional status.  He is in agreement that he can go home.  States he is much stronger than before, he has a friend that can asst. Him.  He has a RW at home.  Home health will be a great service to add and he is in agreemnt.  Will ck with MD tomorrow

## 2024-02-21 VITALS
TEMPERATURE: 98 F | HEIGHT: 70 IN | RESPIRATION RATE: 18 BRPM | WEIGHT: 163.63 LBS | DIASTOLIC BLOOD PRESSURE: 57 MMHG | OXYGEN SATURATION: 100 % | SYSTOLIC BLOOD PRESSURE: 89 MMHG | HEART RATE: 78 BPM | BODY MASS INDEX: 23.43 KG/M2

## 2024-02-21 LAB
POCT GLUCOSE: 130 MG/DL (ref 70–110)
POCT GLUCOSE: 83 MG/DL (ref 70–110)
RNP70 AB QUANT (OHS): 0.3 U/ML
SMITH AB QUANT (OHS): <0.7 U/ML

## 2024-02-21 PROCEDURE — 97164 PT RE-EVAL EST PLAN CARE: CPT

## 2024-02-21 PROCEDURE — 25000003 PHARM REV CODE 250: Performed by: INTERNAL MEDICINE

## 2024-02-21 RX ORDER — AMLODIPINE BESYLATE 2.5 MG/1
2.5 TABLET ORAL DAILY
Qty: 30 TABLET | Refills: 11 | Status: SHIPPED | OUTPATIENT
Start: 2024-02-21 | End: 2025-02-20

## 2024-02-21 RX ORDER — ASPIRIN 81 MG/1
81 TABLET ORAL DAILY
Qty: 30 TABLET | Refills: 3 | Status: SHIPPED | OUTPATIENT
Start: 2024-02-21 | End: 2025-02-20

## 2024-02-21 RX ORDER — LACTULOSE 10 G/15ML
30 SOLUTION ORAL 2 TIMES DAILY
Qty: 500 ML | Refills: 1 | Status: SHIPPED | OUTPATIENT
Start: 2024-02-21

## 2024-02-21 RX ORDER — POLYETHYLENE GLYCOL 3350 17 G/17G
17 POWDER, FOR SOLUTION ORAL 2 TIMES DAILY
Qty: 60 EACH | Refills: 0 | Status: SHIPPED | OUTPATIENT
Start: 2024-02-21

## 2024-02-21 RX ADMIN — AMLODIPINE BESYLATE 2.5 MG: 2.5 TABLET ORAL at 09:02

## 2024-02-21 RX ADMIN — ASPIRIN 81 MG: 81 TABLET, COATED ORAL at 09:02

## 2024-02-21 RX ADMIN — POLYETHYLENE GLYCOL 3350 17 G: 17 POWDER, FOR SOLUTION ORAL at 09:02

## 2024-02-21 NOTE — PT/OT/SLP RE-EVAL
Physical Therapy Re-Evaluation    Patient Name:  Silvano Perry   MRN:  [id number]    Recommendations:     Discharge therapy intensity: Low Intensity Therapy   Discharge Equipment Recommendations: none   Barriers to discharge: None    Assessment:     Silvano Perry is a 68 y.o. male admitted with a medical diagnosis of DM.  He presents with the following impairments/functional limitations: weakness, gait instability, impaired balance, decreased safety awareness.    Rehab Prognosis: Good; patient would benefit from acute skilled PT services to address these deficits and reach maximum level of function.    Recent Surgery: * No surgery found *      Plan:     During this hospitalization, patient to be seen 5 x/week to address the identified rehab impairments via gait training, therapeutic activities, therapeutic exercises and progress toward the following goals:    Plan of Care Expires:  03/11/24    PT/PTA conference to discuss PT POC and patient's progression towards goals held with Katharine Augilar PTA.     Subjective     Chief Complaint:  none  Patient/Family Comments/goals: none  Pain/Comfort:  Pain Rating 1: 0/10    Patients cultural, spiritual, Anabaptism conflicts given the current situation: no    Objective:     Communicated with nurse prior to session.  Patient found supine with telemetry  upon PT entry to room.    General Precautions: Standard, fall  Orthopedic Precautions:N/A   Braces: N/A  Respiratory Status: Room air      Exams:  Cognitive Exam:  Patient is oriented to Person, Place, Time, and Situation  Sensation: -       Intact  RLE ROM: WFL  RLE Strength: 4/5 grossly  LLE ROM: WFL  LLE Strength: 4/5 grossly  Skin integrity: Visible skin intact      Functional Mobility:  Bed Mobility:  Supine to Sit: stand by assistance  Transfers:  Sit to Stand:  stand by assistance with no AD  Gait: 215 ft with RW & SBA  Balance: fair      AM-PAC 6 CLICK MOBILITY  Total Score:24     Education Provided:  Role and goals of PT,  transfer training, bed mobility, gait training, balance training, safety awareness, assistive device, strengthening exercises, and importance of participating in PT to return to PLOF.      Patient left up in chair with all lines intact and call button in reach.    GOALS:   Multidisciplinary Problems       Physical Therapy Goals          Problem: Physical Therapy    Goal Priority Disciplines Outcome Goal Variances Interventions   Physical Therapy Goal     PT, PT/OT Ongoing, Progressing     Description: Goals to be met by: 3/11/24     Patient will increase functional independence with mobility by performin. Supine to sit with [place]  2. Sit to supine with [place]  3. Sit to stand transfer with Modified [place]  4. Gait  x 300 feet with Modified [place] using Rolling Walker.                          History:     Past Medical History:   Diagnosis Date    Diabetes mellitus        Past Surgical History:   Procedure Laterality Date    Bilateral great toe amputations         Time Tracking:     PT Received On: 24  PT Start Time: 0745     PT Stop Time: 0800  PT Total Time (min): 15 min     Billable Minutes: Re-eval 15 minutes      2024

## 2024-02-21 NOTE — PLAN OF CARE
Will disch home today [facility] for [facility] home health.  Linda IRWIN will send referral.  I will arrange transportation

## 2024-02-21 NOTE — PLAN OF CARE
Problem: Physical Therapy  Goal: Physical Therapy Goal  Description: Goals to be met by: 3/11/24     Patient will increase functional independence with mobility by performin. Supine to sit with [place]  2. Sit to supine with [place]  3. Sit to stand transfer with Modified [place]  4. Gait  x 300 feet with Modified [place] using Rolling Walker.     Outcome: Ongoing, Progressing

## 2024-02-21 NOTE — PLAN OF CARE
Deepa will transport pt at 1245.  Pt. Will go per w/c, he has his house key and resides on 1st floor, no steps, all info per pt. Disch home today

## 2024-02-22 ENCOUNTER — PATIENT OUTREACH (OUTPATIENT)
Dept: ADMINISTRATIVE | Facility: CLINIC | Age: 69
End: 2024-02-22
Payer: MEDICARE

## 2024-02-22 NOTE — PROGRESS NOTES
C3 nurse spoke with Silvano Perry  for a TCC post hospital discharge follow up call. The patient reports does not have a scheduled HOSFU appointment. C3 nurse was unable to schedule HOSFU appointment for [facility] PCP. Patient advised to contact their PCP to schedule a HOSPFU within 5-7 days.

## 2024-03-22 NOTE — DISCHARGE SUMMARY
[facility]  Hospital Medicine Discharge Summary    Admit Date: 2/10/2024  Discharge Date and Time: 02/21/2024  Admitting Physician:  Team  Discharging Physician: Deion Morris MD.  Primary Care Physician: Fredo Perry MD  Consults: Hospital Medicine    Discharge Diagnoses:  Near syncope with fall  Bilateral carotid artery disease-50-69% stenosis  Mild left-sided weakness-rule out CVA   Proximal muscle weakness 2/2 possible Myositis  Elevated ESR/CRP  Traumatic left sided rib fractures   Left-sided Pneumonia  Acute Enterococcal UTI  Sepsis secondary to Above  DM II  HTN  HLD     Hospital Course:   68-year-old male with significant history of type 2 diabetes mellitus, diabetic foot wound several years ago presented to [facility] facility after a fall at [employer]. Patient fell on his left side of the chest and hit his head. No loss of consciousness. Patient has been noncompliant with all his medications for the past 2 years due to lack of insurance. Reported generalized weakness, polyuria and polydipsia for the past 1 week. Patient was found to have acute kidney injury, hyperglycemia and UTI at [facility]. CT abdomen/pelvis was unremarkable. Patient was transferred to [facility] for admission under hospitalist medicine service. CXR showed left-sided rib fractures with confluence opacities in the left retrocardiac region indicative of possible infiltrate/atelectasis. Unsure if he had a near syncopal episode. CT head, ultrasound carotid and echocardiogram ordered. initiated IV fluids. initiated long-acting insulin. patient also was hypertensive and therefore initiated on antihypertensives . ceftriaxone for UTI pending urine cultures .case management consulted to assist with DC disposition. Patient has bilateral carotid artery disease with 50-69 % stenosis. Echocardiogram with diastolic dysfunction, no valvular heart disease, EF 65%. Urine cultures-Enterococcus, treated with  "ciprofloxacin. Speech therapy consulted for dysphagia which patient reported; placed patient on soft and bite size solids and liquids. MRI Brain showed sequelae of chronic small-vessel ischemic disease without acute intracranial abnormalities. PT/OT consulted; recommending moderate intensity therapy. CM to assist with SNF placement. ESR/CRP noted to be elevated. Autoimmune workup ordered given patient's proximal muscle weakness & dysphagia which was noted to be negative.     Pt was seen and examined on the day of discharge. Mr. Perry stated he was doing well and had no new complaints. PT changed recommendation to low intensity therapy. Planned for DC with HH today.    Vitals:  VITAL SIGNS: 24 HRS MIN & MAX LAST   No data recorded 98 °F (36.7 °C)   No data recorded (!) 89/57   No data recorded  78   No data recorded 18   No data recorded 100 %       Physical Exam:  General: alert male lying comfortably in bed, in no acute distress  HENT: oral and oropharyngeal mucosa moist, pink, with no erythema or exudates, no ear pain or discharge  Neck: normal neck movement, no lymph nodes or swellings, no JVD or Carotid bruit  Respiratory: clear breathing sounds bilaterally, no crackles, rales, ronchi or wheezes  Cardiovascular: clear S1 and S2, no murmurs, rubs or gallops  Peripheral Vascular: no lesions, ulcers or erosions, normal peripheral pulses and no pedal edema  Gastrointestinal: soft, non-tender, non-distended abdomen, no guarding, rigidity or rebound tenderness, normal bowel sounds  Integumentary: normal skin color, no rashes or lesions  Neuro: AAO x 3; motor strength 5/5 in B/L UEs & LEs; sensation intact to gross and fine touch B/L; CN II-XII grossly intact     Procedures Performed: No admission procedures for hospital encounter.     Significant Diagnostic Studies: See Full reports for all details    No results for input(s): "WBC", "RBC", "HGB", "HCT", "MCV", "MCH", "MCHC", "RDW", "PLT", "MPV", "GRAN", "LYMPH", " ""MONO", "BASO", "NRBC" in the last 168 hours.    No results for input(s): "NA", "K", "CL", "CO2", "ANIONGAP", "BUN", "CREATININE", "GLU", "CALCIUM", "PH", "MG", "ALBUMIN", "PROT", "ALKPHOS", "ALT", "AST", "BILITOT" in the last 168 hours.     Microbiology Results (last 7 days)       ** No results found for the last 168 hours. **             MRI Brain Without Contrast  Narrative: EXAMINATION:  MRI BRAIN WITHOUT CONTRAST    CLINICAL HISTORY:  Rule out CVA;    TECHNIQUE:  Multiplanar multisequence MR imaging of the brain was performed without contrast.    COMPARISON:  CT of the head 02/11/2024.    FINDINGS:  There is normal brain formation.  There is mild diffuse cerebral atrophy.  There is periventricular and subcortical FLAIR hyperintensity.  There is no restricted diffusion.  There is no hemorrhage, hydrocephalus, or midline shift.  There is no cytotoxic or vasogenic edema.  There is no intra or extra-axial fluid collection.  There is no herniation.    There are normal flow voids in the bilateral carotid siphons.  The sella is normal.  The cerebellar tonsils are normally position.  There is no evidence of intracranial hypotension.  The calvarium is normal in signal intensity.  The bilateral orbits are normal.  The paranasal sinuses are free of disease.  Impression: Sequela of chronic small vessel ischemic disease without acute intracranial abnormality.    Electronically signed by: Osvaldo Turner MD  Date:    02/17/2024  Time:    09:49         Medication List        START taking these medications      amLODIPine 2.5 MG tablet  Commonly known as: NORVASC  Take 1 tablet (2.5 mg total) by mouth once daily.     aspirin 81 MG EC tablet  Commonly known as: ECOTRIN  Take 1 tablet (81 mg total) by mouth once daily.     insulin detemir U-100 100 unit/mL injection  Commonly known as: Levemir  Inject 22 Units into the skin every evening.     lactulose 10 gram/15 ml 10 gram/15 mL (15 mL) solution  Commonly known as: CHRONULAC  Take " 45 mLs (30 g total) by mouth 2 (two) times daily.     polyethylene glycol 17 gram Pwpk  Commonly known as: GLYCOLAX  Take 17 g by mouth 2 (two) times daily.               Where to Get Your Medications        These medications were sent to [id number] - [place] - [address] AT [facility]  [address] [address]      Phone: [phone]   amLODIPine 2.5 MG tablet  aspirin 81 MG EC tablet  insulin detemir U-100 100 unit/mL injection  lactulose 10 gram/15 ml 10 gram/15 mL (15 mL) solution  polyethylene glycol 17 gram Pwpk          Explained in detail to the patient about the discharge plan, medications, and follow-up visits. Pt understands and agrees with the treatment plan  [place] [facility] [place] Condition: stable  Diet-    Medications Per DC med rec  Activities as tolerated   Follow-up Information       Fredo Perry MD. Schedule an appointment as soon as possible for a visit in 2 week(s).    Specialty: Family Medicine  Contact information:  [address]  [address]  [place] [id number] [phone]               [place] - Follow up.    Specialties: Home Health Services, Hospice and Palliative Medicine, Physical Therapy  Contact information:  [address]  [facility] [phone]                           For further questions contact hospitalist office    Discharge time 33 minutes    For worsening symptoms, chest pain, shortness of breath, increased abdominal pain, high grade fever, stroke or stroke like symptoms, immediately go to the nearest Emergency Room or call 911 as soon as possible.      Deion Taylor M.D.

## 2024-08-30 ENCOUNTER — LAB REQUISITION (OUTPATIENT)
Dept: LAB | Facility: HOSPITAL | Age: 69
End: 2024-08-30
Payer: MEDICARE

## 2024-08-30 DIAGNOSIS — G62.9 POLYNEUROPATHY, UNSPECIFIED: ICD-10-CM

## 2024-08-30 DIAGNOSIS — E11.9 TYPE 2 DIABETES MELLITUS WITHOUT COMPLICATIONS: ICD-10-CM

## 2024-08-30 LAB
ALBUMIN SERPL-MCNC: 2.8 G/DL (ref 3.4–4.8)
ALBUMIN/GLOB SERPL: 0.8 RATIO (ref 1.1–2)
ALP SERPL-CCNC: 92 UNIT/L (ref 40–150)
ALT SERPL-CCNC: 12 UNIT/L (ref 0–55)
ANION GAP SERPL CALC-SCNC: 9 MEQ/L
AST SERPL-CCNC: 15 UNIT/L (ref 5–34)
BASOPHILS # BLD AUTO: 0.07 X10(3)/MCL
BASOPHILS NFR BLD AUTO: 0.9 %
BILIRUB SERPL-MCNC: 0.3 MG/DL
BUN SERPL-MCNC: 11.5 MG/DL (ref 8.4–25.7)
CALCIUM SERPL-MCNC: 9.2 MG/DL (ref 8.8–10)
CHLORIDE SERPL-SCNC: 106 MMOL/L (ref 98–107)
CO2 SERPL-SCNC: 25 MMOL/L (ref 23–31)
CREAT SERPL-MCNC: 0.81 MG/DL (ref 0.73–1.18)
CREAT/UREA NIT SERPL: 14
EOSINOPHIL # BLD AUTO: 0.3 X10(3)/MCL (ref 0–0.9)
EOSINOPHIL NFR BLD AUTO: 3.9 %
ERYTHROCYTE [DISTWIDTH] IN BLOOD BY AUTOMATED COUNT: 17.3 % (ref 11.5–17)
EST. AVERAGE GLUCOSE BLD GHB EST-MCNC: 203 MG/DL
GFR SERPLBLD CREATININE-BSD FMLA CKD-EPI: >60 ML/MIN/1.73/M2
GLOBULIN SER-MCNC: 3.4 GM/DL (ref 2.4–3.5)
GLUCOSE SERPL-MCNC: 164 MG/DL (ref 82–115)
HBA1C MFR BLD: 8.7 %
HCT VFR BLD AUTO: 37.9 % (ref 42–52)
HGB BLD-MCNC: 11.8 G/DL (ref 14–18)
IMM GRANULOCYTES # BLD AUTO: 0.02 X10(3)/MCL (ref 0–0.04)
IMM GRANULOCYTES NFR BLD AUTO: 0.3 %
LYMPHOCYTES # BLD AUTO: 1.95 X10(3)/MCL (ref 0.6–4.6)
LYMPHOCYTES NFR BLD AUTO: 25.1 %
MCH RBC QN AUTO: 24.9 PG (ref 27–31)
MCHC RBC AUTO-ENTMCNC: 31.1 G/DL (ref 33–36)
MCV RBC AUTO: 80 FL (ref 80–94)
MONOCYTES # BLD AUTO: 0.62 X10(3)/MCL (ref 0.1–1.3)
MONOCYTES NFR BLD AUTO: 8 %
NEUTROPHILS # BLD AUTO: 4.82 X10(3)/MCL (ref 2.1–9.2)
NEUTROPHILS NFR BLD AUTO: 61.8 %
NRBC BLD AUTO-RTO: 0 %
PLATELET # BLD AUTO: 239 X10(3)/MCL (ref 130–400)
PMV BLD AUTO: 11.3 FL (ref 7.4–10.4)
POTASSIUM SERPL-SCNC: 4 MMOL/L (ref 3.5–5.1)
PREALB SERPL-MCNC: 18.5 MG/DL (ref 16–42)
PROT SERPL-MCNC: 6.2 GM/DL (ref 5.8–7.6)
RBC # BLD AUTO: 4.74 X10(6)/MCL (ref 4.7–6.1)
SODIUM SERPL-SCNC: 140 MMOL/L (ref 136–145)
TSH SERPL-ACNC: 4.01 UIU/ML (ref 0.35–4.94)
WBC # BLD AUTO: 7.78 X10(3)/MCL (ref 4.5–11.5)

## 2024-08-30 PROCEDURE — 80053 COMPREHEN METABOLIC PANEL: CPT | Performed by: NURSE PRACTITIONER

## 2024-08-30 PROCEDURE — 84134 ASSAY OF PREALBUMIN: CPT | Performed by: NURSE PRACTITIONER

## 2024-08-30 PROCEDURE — 83036 HEMOGLOBIN GLYCOSYLATED A1C: CPT | Performed by: NURSE PRACTITIONER

## 2024-08-30 PROCEDURE — 85025 COMPLETE CBC W/AUTO DIFF WBC: CPT | Performed by: NURSE PRACTITIONER

## 2024-08-30 PROCEDURE — 84443 ASSAY THYROID STIM HORMONE: CPT | Performed by: NURSE PRACTITIONER

## 2024-10-21 ENCOUNTER — LAB REQUISITION (OUTPATIENT)
Dept: LAB | Facility: HOSPITAL | Age: 69
End: 2024-10-21
Payer: MEDICARE

## 2024-10-21 DIAGNOSIS — E11.9 TYPE 2 DIABETES MELLITUS WITHOUT COMPLICATIONS: ICD-10-CM

## 2024-10-21 LAB
FOLATE SERPL-MCNC: 8.9 NG/ML (ref 7–31.4)
HAV IGM SERPL QL IA: NONREACTIVE
HBV CORE IGM SERPL QL IA: NONREACTIVE
HBV SURFACE AG SERPL QL IA: NONREACTIVE
HCV AB SERPL QL IA: NONREACTIVE
PSA SERPL-MCNC: 0.51 NG/ML
VIT B12 SERPL-MCNC: 451 PG/ML (ref 213–816)

## 2024-10-21 PROCEDURE — 84153 ASSAY OF PSA TOTAL: CPT | Performed by: NURSE PRACTITIONER

## 2024-10-21 PROCEDURE — 80074 ACUTE HEPATITIS PANEL: CPT | Performed by: NURSE PRACTITIONER

## 2024-10-21 PROCEDURE — 82607 VITAMIN B-12: CPT | Performed by: NURSE PRACTITIONER

## 2024-10-21 PROCEDURE — 82746 ASSAY OF FOLIC ACID SERUM: CPT | Performed by: NURSE PRACTITIONER

## 2024-10-22 ENCOUNTER — LAB REQUISITION (OUTPATIENT)
Dept: LAB | Facility: HOSPITAL | Age: 69
End: 2024-10-22
Payer: MEDICARE

## 2024-10-22 DIAGNOSIS — E11.9 TYPE 2 DIABETES MELLITUS WITHOUT COMPLICATIONS: ICD-10-CM

## 2024-10-22 LAB
CREAT UR-MCNC: 77.4 MG/DL (ref 63–166)
MICROALBUMIN UR-MCNC: 41 UG/ML
MICROALBUMIN/CREAT RATIO PNL UR: 53 MG/GM CR (ref 0–30)

## 2024-10-22 PROCEDURE — 82043 UR ALBUMIN QUANTITATIVE: CPT | Performed by: NURSE PRACTITIONER

## 2024-10-22 PROCEDURE — 82570 ASSAY OF URINE CREATININE: CPT | Performed by: NURSE PRACTITIONER

## 2024-10-23 PROCEDURE — 82270 OCCULT BLOOD FECES: CPT | Performed by: INTERNAL MEDICINE

## 2024-10-24 ENCOUNTER — LAB REQUISITION (OUTPATIENT)
Dept: LAB | Facility: HOSPITAL | Age: 69
End: 2024-10-24
Payer: MEDICAID

## 2024-10-24 DIAGNOSIS — D64.9 ANEMIA, UNSPECIFIED: ICD-10-CM

## 2024-10-24 LAB
COLOR STL: NORMAL
CONSISTENCY STL: NORMAL
HEMOCCULT SP1 STL QL: NEGATIVE

## 2024-10-25 PROCEDURE — 82270 OCCULT BLOOD FECES: CPT | Performed by: INTERNAL MEDICINE

## 2024-10-26 ENCOUNTER — LAB REQUISITION (OUTPATIENT)
Dept: LAB | Facility: HOSPITAL | Age: 69
End: 2024-10-26
Payer: MEDICARE

## 2024-10-26 DIAGNOSIS — D64.9 ANEMIA, UNSPECIFIED: ICD-10-CM

## 2024-10-26 LAB — HEMOCCULT SP1 STL QL: NEGATIVE

## 2024-11-05 ENCOUNTER — LAB REQUISITION (OUTPATIENT)
Dept: LAB | Facility: HOSPITAL | Age: 69
End: 2024-11-05
Payer: MEDICARE

## 2024-11-05 DIAGNOSIS — E11.9 TYPE 2 DIABETES MELLITUS WITHOUT COMPLICATIONS: ICD-10-CM

## 2024-11-05 LAB
EST. AVERAGE GLUCOSE BLD GHB EST-MCNC: 182.9 MG/DL
HBA1C MFR BLD: 8 %

## 2024-11-05 PROCEDURE — 83036 HEMOGLOBIN GLYCOSYLATED A1C: CPT | Performed by: NURSE PRACTITIONER

## 2024-11-09 ENCOUNTER — LAB REQUISITION (OUTPATIENT)
Dept: LAB | Facility: HOSPITAL | Age: 69
End: 2024-11-09
Payer: MEDICARE

## 2024-11-09 DIAGNOSIS — D64.9 ANEMIA, UNSPECIFIED: ICD-10-CM

## 2024-11-09 LAB — HEMOCCULT SP1 STL QL: POSITIVE

## 2024-11-09 PROCEDURE — 82270 OCCULT BLOOD FECES: CPT | Performed by: INTERNAL MEDICINE

## 2025-01-28 ENCOUNTER — LAB REQUISITION (OUTPATIENT)
Dept: LAB | Facility: HOSPITAL | Age: 70
End: 2025-01-28
Payer: MEDICARE

## 2025-01-28 DIAGNOSIS — E11.9 TYPE 2 DIABETES MELLITUS WITHOUT COMPLICATIONS: ICD-10-CM

## 2025-01-28 LAB
25(OH)D3+25(OH)D2 SERPL-MCNC: 17 NG/ML (ref 30–80)
ALBUMIN SERPL-MCNC: 3.8 G/DL (ref 3.4–4.8)
ALBUMIN/GLOB SERPL: 1.2 RATIO (ref 1.1–2)
ALP SERPL-CCNC: 90 UNIT/L (ref 40–150)
ALT SERPL-CCNC: 14 UNIT/L (ref 0–55)
ANION GAP SERPL CALC-SCNC: 12 MEQ/L
AST SERPL-CCNC: 19 UNIT/L (ref 5–34)
BASOPHILS # BLD AUTO: 0.09 X10(3)/MCL
BASOPHILS NFR BLD AUTO: 0.9 %
BILIRUB SERPL-MCNC: 0.3 MG/DL
BUN SERPL-MCNC: 16.9 MG/DL (ref 8.4–25.7)
CALCIUM SERPL-MCNC: 9.9 MG/DL (ref 8.8–10)
CHLORIDE SERPL-SCNC: 103 MMOL/L (ref 98–107)
CO2 SERPL-SCNC: 25 MMOL/L (ref 23–31)
CREAT SERPL-MCNC: 1.02 MG/DL (ref 0.72–1.25)
CREAT/UREA NIT SERPL: 17
EOSINOPHIL # BLD AUTO: 0.31 X10(3)/MCL (ref 0–0.9)
EOSINOPHIL NFR BLD AUTO: 3.2 %
ERYTHROCYTE [DISTWIDTH] IN BLOOD BY AUTOMATED COUNT: 15.6 % (ref 11.5–17)
EST. AVERAGE GLUCOSE BLD GHB EST-MCNC: 159.9 MG/DL
GFR SERPLBLD CREATININE-BSD FMLA CKD-EPI: >60 ML/MIN/1.73/M2
GLOBULIN SER-MCNC: 3.1 GM/DL (ref 2.4–3.5)
GLUCOSE SERPL-MCNC: 166 MG/DL (ref 82–115)
HBA1C MFR BLD: 7.2 %
HCT VFR BLD AUTO: 38.6 % (ref 42–52)
HGB BLD-MCNC: 11.8 G/DL (ref 14–18)
IMM GRANULOCYTES # BLD AUTO: 0.04 X10(3)/MCL (ref 0–0.04)
IMM GRANULOCYTES NFR BLD AUTO: 0.4 %
IRON SATN MFR SERPL: 17 % (ref 20–50)
IRON SERPL-MCNC: 58 UG/DL (ref 65–175)
LYMPHOCYTES # BLD AUTO: 1.69 X10(3)/MCL (ref 0.6–4.6)
LYMPHOCYTES NFR BLD AUTO: 17.2 %
MCH RBC QN AUTO: 26.5 PG (ref 27–31)
MCHC RBC AUTO-ENTMCNC: 30.6 G/DL (ref 33–36)
MCV RBC AUTO: 86.5 FL (ref 80–94)
MONOCYTES # BLD AUTO: 0.82 X10(3)/MCL (ref 0.1–1.3)
MONOCYTES NFR BLD AUTO: 8.3 %
NEUTROPHILS # BLD AUTO: 6.88 X10(3)/MCL (ref 2.1–9.2)
NEUTROPHILS NFR BLD AUTO: 70 %
NRBC BLD AUTO-RTO: 0 %
PLATELET # BLD AUTO: 232 X10(3)/MCL (ref 130–400)
PMV BLD AUTO: 12.1 FL (ref 7.4–10.4)
POTASSIUM SERPL-SCNC: 4.7 MMOL/L (ref 3.5–5.1)
PREALB SERPL-MCNC: 25.9 MG/DL (ref 16–42)
PROT SERPL-MCNC: 6.9 GM/DL (ref 5.8–7.6)
RBC # BLD AUTO: 4.46 X10(6)/MCL (ref 4.7–6.1)
SODIUM SERPL-SCNC: 140 MMOL/L (ref 136–145)
T4 FREE SERPL-MCNC: 0.92 NG/DL (ref 0.7–1.48)
TIBC SERPL-MCNC: 278 UG/DL (ref 60–240)
TIBC SERPL-MCNC: 336 UG/DL (ref 250–450)
TRANSFERRIN SERPL-MCNC: 309 MG/DL (ref 163–344)
TSH SERPL-ACNC: 5.06 UIU/ML (ref 0.35–4.94)
WBC # BLD AUTO: 9.83 X10(3)/MCL (ref 4.5–11.5)

## 2025-01-28 PROCEDURE — 83540 ASSAY OF IRON: CPT | Performed by: NURSE PRACTITIONER

## 2025-01-28 PROCEDURE — 85025 COMPLETE CBC W/AUTO DIFF WBC: CPT | Performed by: NURSE PRACTITIONER

## 2025-01-28 PROCEDURE — 80053 COMPREHEN METABOLIC PANEL: CPT | Performed by: NURSE PRACTITIONER

## 2025-01-28 PROCEDURE — 84134 ASSAY OF PREALBUMIN: CPT | Performed by: NURSE PRACTITIONER

## 2025-01-28 PROCEDURE — 82306 VITAMIN D 25 HYDROXY: CPT | Performed by: NURSE PRACTITIONER

## 2025-01-28 PROCEDURE — 84439 ASSAY OF FREE THYROXINE: CPT | Performed by: NURSE PRACTITIONER

## 2025-01-28 PROCEDURE — 84443 ASSAY THYROID STIM HORMONE: CPT | Performed by: NURSE PRACTITIONER

## 2025-01-28 PROCEDURE — 83036 HEMOGLOBIN GLYCOSYLATED A1C: CPT | Performed by: NURSE PRACTITIONER

## 2025-03-24 ENCOUNTER — LAB REQUISITION (OUTPATIENT)
Dept: LAB | Facility: HOSPITAL | Age: 70
End: 2025-03-24
Payer: MEDICARE

## 2025-03-24 DIAGNOSIS — G31.84 MILD COGNITIVE IMPAIRMENT OF UNCERTAIN OR UNKNOWN ETIOLOGY: ICD-10-CM

## 2025-03-24 LAB
AMORPH URATE CRY URNS QL MICRO: ABNORMAL /HPF
BACTERIA #/AREA URNS AUTO: ABNORMAL /HPF
BILIRUB UR QL STRIP.AUTO: NEGATIVE
CAOX CRY UR QL COMP ASSIST: ABNORMAL
CLARITY UR: ABNORMAL
COLOR UR AUTO: ABNORMAL
GLUCOSE UR QL STRIP: 500
HGB UR QL STRIP: ABNORMAL
KETONES UR QL STRIP: NEGATIVE
LEUKOCYTE ESTERASE UR QL STRIP: NEGATIVE
NITRITE UR QL STRIP: POSITIVE
PH UR STRIP: 5.5 [PH]
PROT UR QL STRIP: 100
RBC #/AREA URNS AUTO: ABNORMAL /HPF
SP GR UR STRIP.AUTO: >=1.03 (ref 1–1.03)
SQUAMOUS #/AREA URNS AUTO: ABNORMAL /HPF
UROBILINOGEN UR STRIP-ACNC: 0.2
WBC #/AREA URNS AUTO: ABNORMAL /HPF

## 2025-03-24 PROCEDURE — 81003 URINALYSIS AUTO W/O SCOPE: CPT | Performed by: NURSE PRACTITIONER

## 2025-03-28 ENCOUNTER — HOSPITAL ENCOUNTER (EMERGENCY)
Facility: HOSPITAL | Age: 70
Discharge: OTHER INSTITUTION | End: 2025-03-28
Attending: STUDENT IN AN ORGANIZED HEALTH CARE EDUCATION/TRAINING PROGRAM
Payer: MEDICARE

## 2025-03-28 VITALS
HEART RATE: 75 BPM | DIASTOLIC BLOOD PRESSURE: 80 MMHG | HEIGHT: 70 IN | BODY MASS INDEX: 22.9 KG/M2 | OXYGEN SATURATION: 98 % | WEIGHT: 160 LBS | RESPIRATION RATE: 18 BRPM | SYSTOLIC BLOOD PRESSURE: 145 MMHG | TEMPERATURE: 98 F
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
## 2025-03-28 DIAGNOSIS — S01.81XA FACIAL LACERATION, INITIAL ENCOUNTER: ICD-10-CM

## 2025-03-28 DIAGNOSIS — R53.1 WEAKNESS: ICD-10-CM

## 2025-03-28 DIAGNOSIS — S00.93XA CONTUSION OF HEAD, UNSPECIFIED PART OF HEAD, INITIAL ENCOUNTER: ICD-10-CM

## 2025-03-28 LAB
ALBUMIN SERPL-MCNC: 3.2 G/DL (ref 3.4–4.8)
ALBUMIN/GLOB SERPL: 0.8 RATIO (ref 1.1–2)
ALP SERPL-CCNC: 76 UNIT/L (ref 40–150)
ALT SERPL-CCNC: 14 UNIT/L (ref 0–55)
ANION GAP SERPL CALC-SCNC: 11 MEQ/L
AST SERPL-CCNC: 23 UNIT/L (ref 11–45)
BASOPHILS # BLD AUTO: 0.02 X10(3)/MCL
BASOPHILS NFR BLD AUTO: 0.2 %
BILIRUB SERPL-MCNC: 0.5 MG/DL
BUN SERPL-MCNC: 18.3 MG/DL (ref 8.4–25.7)
CALCIUM SERPL-MCNC: 9.3 MG/DL (ref 8.8–10)
CHLORIDE SERPL-SCNC: 104 MMOL/L (ref 98–107)
CO2 SERPL-SCNC: 23 MMOL/L (ref 23–31)
CREAT SERPL-MCNC: 0.89 MG/DL (ref 0.72–1.25)
CREAT/UREA NIT SERPL: 21
EOSINOPHIL # BLD AUTO: 0.08 X10(3)/MCL (ref 0–0.9)
EOSINOPHIL NFR BLD AUTO: 0.8 %
ERYTHROCYTE [DISTWIDTH] IN BLOOD BY AUTOMATED COUNT: 14.6 % (ref 11.5–17)
GFR SERPLBLD CREATININE-BSD FMLA CKD-EPI: >60 ML/MIN/1.73/M2
GLOBULIN SER-MCNC: 4.2 GM/DL (ref 2.4–3.5)
GLUCOSE SERPL-MCNC: 130 MG/DL (ref 82–115)
HCT VFR BLD AUTO: 31.9 % (ref 42–52)
HGB BLD-MCNC: 10.2 G/DL (ref 14–18)
IMM GRANULOCYTES # BLD AUTO: 0.03 X10(3)/MCL (ref 0–0.04)
IMM GRANULOCYTES NFR BLD AUTO: 0.3 %
LYMPHOCYTES # BLD AUTO: 1.15 X10(3)/MCL (ref 0.6–4.6)
LYMPHOCYTES NFR BLD AUTO: 11.1 %
MCH RBC QN AUTO: 27.1 PG (ref 27–31)
MCHC RBC AUTO-ENTMCNC: 32 G/DL (ref 33–36)
MCV RBC AUTO: 84.8 FL (ref 80–94)
MONOCYTES # BLD AUTO: 0.77 X10(3)/MCL (ref 0.1–1.3)
MONOCYTES NFR BLD AUTO: 7.5 %
NEUTROPHILS # BLD AUTO: 8.28 X10(3)/MCL (ref 2.1–9.2)
NEUTROPHILS NFR BLD AUTO: 80.1 %
NRBC BLD AUTO-RTO: 0 %
OHS QRS DURATION: 92 MS
OHS QTC CALCULATION: 415 MS
PLATELET # BLD AUTO: 109 X10(3)/MCL (ref 130–400)
PMV BLD AUTO: 12.5 FL (ref 7.4–10.4)
POCT GLUCOSE: 158 MG/DL (ref 70–110)
POTASSIUM SERPL-SCNC: 3.8 MMOL/L (ref 3.5–5.1)
PROT SERPL-MCNC: 7.4 GM/DL (ref 5.8–7.6)
RBC # BLD AUTO: 3.76 X10(6)/MCL (ref 4.7–6.1)
SODIUM SERPL-SCNC: 138 MMOL/L (ref 136–145)
WBC # BLD AUTO: 10.33 X10(3)/MCL (ref 4.5–11.5)

## 2025-03-28 PROCEDURE — 85025 COMPLETE CBC W/AUTO DIFF WBC: CPT | Performed by: STUDENT IN AN ORGANIZED HEALTH CARE EDUCATION/TRAINING PROGRAM

## 2025-03-28 PROCEDURE — 99285 EMERGENCY DEPT VISIT HI MDM: CPT | Mod: 25

## 2025-03-28 PROCEDURE — 80053 COMPREHEN METABOLIC PANEL: CPT | Performed by: STUDENT IN AN ORGANIZED HEALTH CARE EDUCATION/TRAINING PROGRAM

## 2025-03-28 PROCEDURE — 63600175 PHARM REV CODE 636 W HCPCS: Performed by: STUDENT IN AN ORGANIZED HEALTH CARE EDUCATION/TRAINING PROGRAM

## 2025-03-28 PROCEDURE — 93010 ELECTROCARDIOGRAM REPORT: CPT | Mod: ,,, | Performed by: INTERNAL MEDICINE

## 2025-03-28 PROCEDURE — 93005 ELECTROCARDIOGRAM TRACING: CPT

## 2025-03-28 PROCEDURE — 12013 RPR F/E/E/N/L/M 2.6-5.0 CM: CPT

## 2025-03-28 PROCEDURE — 82962 GLUCOSE BLOOD TEST: CPT

## 2025-03-28 RX ORDER — LIDOCAINE HYDROCHLORIDE 10 MG/ML
10 INJECTION, SOLUTION INFILTRATION; PERINEURAL
Status: COMPLETED | OUTPATIENT
Start: 2025-03-28 | End: 2025-03-28

## 2025-03-28 RX ADMIN — LIDOCAINE HYDROCHLORIDE 10 ML: 10 INJECTION, SOLUTION INFILTRATION; PERINEURAL at 03:03

## 2025-03-28 NOTE — ED PROVIDER NOTES
Encounter Date: 3/28/2025    SCRIBE #1 NOTE: I, Mila Adan, am scribing for, and in the presence of,  Bakari Devries MD. I have scribed the following portions of the note - Other sections scribed: HPI, ROS, PE.       History     Chief Complaint   Patient presents with    Fall     Pt got weak going to the bathroom and fell backwards. Denies chest pain or SOB. Lac to L eyebrow  and L elbow. States takes daily ASA denies BT. -LOC. GCS 15.       Patient is a 69-year-old male with a history of DM presenting to the ED following a fall. The pt states he became globally weak while ambulating to the restroom with his walker, causing him to fall and strike his head. Negative LOC. The pt denies any current complaints including pain or nausea. The pt takes an aspirin daily and denies any other anticoagulants.     The history is provided by the patient. No  was used.     Review of patient's allergies indicates:  No Known Allergies  Past Medical History:   Diagnosis Date    Diabetes mellitus      Past Surgical History:   Procedure Laterality Date    Bilateral great toe amputations       No family history on file.  Social History[1]  Review of Systems   Constitutional:  Negative for fever.   HENT:  Negative for sore throat.    Eyes:  Negative for visual disturbance.   Respiratory:  Negative for shortness of breath.    Cardiovascular:  Negative for chest pain.   Gastrointestinal:  Negative for abdominal pain.   Genitourinary:  Negative for dysuria.   Musculoskeletal:  Negative for joint swelling.   Skin:  Negative for rash.   Neurological:  Negative for weakness.   Psychiatric/Behavioral:  Negative for confusion.    All other systems reviewed and are negative.      Physical Exam     Initial Vitals [03/28/25 0219]   BP Pulse Resp Temp SpO2   (!) 167/83 94 16 98.2 °F (36.8 °C) 98 %      MAP       --         Physical Exam    Nursing note and vitals reviewed.  Constitutional: He appears well-developed and  well-nourished. He is not diaphoretic. No distress.   HENT:   Head: Normocephalic.   3 cm laceration to the left eyebrow. Contusion to the posterior scalp. No other abrasions, contusions, lacerations to the scalp or face.  No superior inferior orbital ridge tenderness to palpation.  No zygomatic arch tenderness to palpation.  No epistaxis.  No CSF rhinorrhea.  No septal hematoma.  No intraoral injuries noted.  Normal external ear.  No raccoon eyes.  No Gross sign.     Eyes: Conjunctivae and EOM are normal. Pupils are equal, round, and reactive to light.   Neck:   Normal range of motion.  Cardiovascular:  Normal rate, regular rhythm, normal heart sounds and intact distal pulses.           No murmur heard.  Pulmonary/Chest: Breath sounds normal. No respiratory distress. He has no wheezes. He has no rales.   Abdominal: Abdomen is soft. He exhibits no distension. There is no abdominal tenderness.   Musculoskeletal:         General: No tenderness or edema. Normal range of motion.      Cervical back: Normal range of motion.      Comments: Amputations of the bilateral great toes. Amputation of the distal thumb of the left hand.     Neurological: He is alert and oriented to person, place, and time. No cranial nerve deficit.   Skin: Skin is warm and dry. Capillary refill takes less than 2 seconds. No rash noted. No erythema.   Psychiatric: He has a normal mood and affect.         ED Course   Lac Repair    Date/Time: 3/28/2025 6:00 AM    Performed by: Bakari Devries MD  Authorized by: Bakari Devries MD    Consent:     Consent obtained:  Verbal    Consent given by:  Patient    Risks discussed:  Need for additional repair, nerve damage, infection, poor cosmetic result, pain, tendon damage, retained foreign body, vascular damage and poor wound healing    Alternatives discussed:  No treatment  Universal protocol:     Patient identity confirmed:  Arm band and verbally with patient  Anesthesia:     Anesthesia method:  Local  infiltration    Local anesthetic:  Lidocaine 1% WITH epi  Laceration details:     Location:  Face    Face location:  L eyebrow    Length (cm):  3    Depth (mm):  5  Pre-procedure details:     Preparation:  Patient was prepped and draped in usual sterile fashion and imaging obtained to evaluate for foreign bodies  Exploration:     Hemostasis achieved with:  Direct pressure    Wound exploration: wound explored through full range of motion and entire depth of wound visualized      Wound extent: no areolar tissue violation noted, no fascia violation noted, no foreign bodies/material noted, no muscle damage noted, no nerve damage noted, no tendon damage noted, no underlying fracture noted and no vascular damage noted    Treatment:     Area cleansed with:  Saline    Amount of cleaning:  Standard    Irrigation solution:  Sterile saline    Irrigation volume:  500    Irrigation method:  Pressure wash  Skin repair:     Repair method:  Sutures    Suture size:  5-0    Suture material:  Fast-absorbing gut    Suture technique:  Simple interrupted    Number of sutures:  5  Approximation:     Approximation:  Close  Repair type:     Repair type:  Simple  Post-procedure details:     Dressing:  Sterile dressing    Procedure completion:  Tolerated well, no immediate complications    Labs Reviewed   COMPREHENSIVE METABOLIC PANEL - Abnormal       Result Value    Sodium 138      Potassium 3.8      Chloride 104      CO2 23      Glucose 130 (*)     Blood Urea Nitrogen 18.3      Creatinine 0.89      Calcium 9.3      Protein Total 7.4      Albumin 3.2 (*)     Globulin 4.2 (*)     Albumin/Globulin Ratio 0.8 (*)     Bilirubin Total 0.5      ALP 76      ALT 14      AST 23      eGFR >60      Anion Gap 11.0      BUN/Creatinine Ratio 21     CBC WITH DIFFERENTIAL - Abnormal    WBC 10.33      RBC 3.76 (*)     Hgb 10.2 (*)     Hct 31.9 (*)     MCV 84.8      MCH 27.1      MCHC 32.0 (*)     RDW 14.6      Platelet 109 (*)     MPV 12.5 (*)     Neut % 80.1       Lymph % 11.1      Mono % 7.5      Eos % 0.8      Basophil % 0.2      Imm Grans % 0.3      Neut # 8.28      Lymph # 1.15      Mono # 0.77      Eos # 0.08      Baso # 0.02      Imm Gran # 0.03      NRBC% 0.0     POCT GLUCOSE - Abnormal    POCT Glucose 158 (*)    CBC W/ AUTO DIFFERENTIAL    Narrative:     The following orders were created for panel order CBC auto differential.  Procedure                               Abnormality         Status                     ---------                               -----------         ------                     CBC with Differential[[id number]]       Abnormal            Final result                 Please view results for these tests on the individual orders.     EKG Readings: (Independently Interpreted)   Initial Reading: No STEMI. Rhythm: Normal Sinus Rhythm. Heart Rate: 83. Ectopy: No Ectopy. Conduction: Normal. ST Segments: Normal ST Segments. T Waves: Normal. Axis: Normal.   Time: 0226.     ECG Results              EKG and show to ED MD (Final result)        Collection Time Result Time QRS Duration OHS QTC Calculation    03/28/25 02:26:08 03/28/25 08:53:05 92 415                     Final result by Interface, Lab In [facility] (03/28/25 08:53:09)                   Narrative:    Test Reason : R53.1,    Vent. Rate :  83 BPM     Atrial Rate :  83 BPM     P-R Int : 144 ms          QRS Dur :  92 ms      QT Int : 354 ms       P-R-T Axes :  79  -2  77 degrees    QTcB Int : 415 ms    Program found technically poor ECG  Normal sinus rhythm  Normal ECG  When compared with ECG of 15-Feb-2024 08:59,  The axis Shifted right  T wave amplitude has increased in Anterior leads  Confirmed by Jorge Luis Potts ([id number]) on 3/28/2025 8:53:01 AM    Referred By:            Confirmed By: Jorge Luis oPtts                                     EKG 12-lead (Final result)  Result time 04/02/25 12:20:59      Final result by Unknown User (04/02/25 12:20:59)                                      Imaging Results               CT Cervical Spine Without Contrast (Final result)  Result time 03/28/25 08:50:34      Final result by Phillip Brito MD (03/28/25 08:50:34)                   Impression:      No acute osseous abnormality which is concordant with preliminary report.    Multilevel cervical degeneration which is grossly stable when compared to 10/25/2020.      Electronically signed by: Phillip Brito  Date:    03/28/2025  Time:    08:50               Narrative:    EXAMINATION:  CT CERVICAL SPINE WITHOUT CONTRAST    CLINICAL HISTORY:  fall;    TECHNIQUE:  Low dose axial images, sagittal and coronal reformations were performed though the cervical spine. Contrast was not administered. Dose reduction techniques including automatic exposure control (AEC) were utilized.    Dose (DLP): 430 mGycm    COMPARISON:  CT cervical spine 10/25/2020.    FINDINGS:  No subluxation.    No acute fracture is demonstrated.  Vertebral body heights are maintained.    Moderate disc degeneration at C5-C6.  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 Moderate multilevel central canal stenosis secondary to combination of spondylosis and congenitally short pedicles most pronounced at C5-C6.  Moderate left foraminal stenosis at C3-C4 due to uncovertebral hypertrophy.  Moderate left foraminal stenosis at C5-C6 due to uncovertebral hypertrophy.  Moderate right foraminal stenosis at C6-C7 due to uncovertebral hypertrophy.    Paravertebral soft tissues are normal.                        Preliminary result by Valentin Best MD (03/28/25 04:07:00)                   Impression:    1. No acute cervical spine fracture dislocation or subluxation is seen.  2. Degenerative changes and other details as above.               Narrative:    START OF REPORT:  Technique: CT of the cervical spine was performed without intravenous contrast with axial as well as sagittal and coronal images.    Comparison: None.    Dosage Information:  Automated exposure control was utilized.    Clinical history: Fall, head injury.    Findings:  Lung apices: The visualized lung apices appear unremarkable.  Spine:  Spinal canal: The spinal canal appears unremarkable.  Mineralization: Within normal limits for age.  Rotation: No significant rotation is seen.  Scoliosis: No significant scoliosis is seen.  Vertebral Fusion: No vertebral fusion is identified.  Listhesis: No significant listhesis is identified.  Lordosis: Moderate straightening of the cervical lordosis is seen. This may be positional or reflect an element of myospasm.  Intervertebral disc spaces: Mildly decreased disc height is seen at C5-C6.  Osteophytes: Moderate multilevel endplate osteophytes are seen.  Endplate Sclerosis: No significant endplate sclerosis is seen.  Uncovertebral degenerative changes: Mild multilevel uncovertebral joint arthrosis is seen.  Facet degenerative changes: Mild multilevel facet degenerative changes are seen.  Calcifications: None.  Fractures: No acute cervical spine fracture dislocation or subluxation is seen.  Orthopedic Hardware: None.    Miscellaneous:  Mastoid air cells: The visualized mastoid air cells appear clear.  Soft Tissues: Unremarkable.                                         CT Head Without Contrast (Final result)  Result time 03/28/25 08:36:59      Final result by Phillip Brito MD (03/28/25 08:36:59)                   Impression:      No acute intracranial abnormality.    Chronic ischemic changes of the brain as detailed above.    I agree with the preliminary report.      Electronically signed by: Phillip Brito  Date:    03/28/2025  Time:    08:36               Narrative:    EXAMINATION:  CT HEAD WITHOUT CONTRAST    CLINICAL HISTORY:  Facial trauma, penetrating;    TECHNIQUE:  Low dose axial images were obtained through the head.  Coronal and sagittal reformations were also performed. Contrast was not administered.  Dose reduction techniques including  automatic exposure control (AEC) were utilized.    Dose (DLP): 940 mGycm    COMPARISON:  MR brain 02/17/2024.  CT head 02/11/2024.    FINDINGS:  INTRACRANIAL: Chronic lacunar infarct of the left putamen.  Mild chronic small vessel ischemic changes along the supratentorial periventricular white matter.  Mild generalized brain atrophy.  No acute intracranial hemorrhage.  No hydrocephalus.  No intracranial mass effect.    SINUSES: Mild mucosal thickening of the left maxillary sinus.  Remaining paranasal sinuses are clear.  Mastoid air cells are clear.    SKULL/SCALP: Visualized osseous structures are normal.    ORBITS: Visualized orbits are normal.                        Preliminary result by Valentin Best MD (03/28/25 03:58:52)                   Impression:    1. No acute intracranial traumatic injury identified. Details and other findings as noted above.               Narrative:    START OF REPORT:  Technique: CT of the head was performed without intravenous contrast with axial as well as coronal and sagittal images.    Comparison: None.    Dosage Information: Automated exposure control was utilized.    Clinical history: Fall, head injury.    Findings:  Hemorrhage: No acute intracranial hemorrhage is seen.  CSF spaces: The ventricles, sulci and basal cisterns all appear moderately prominent consistent with global cerebral atrophy.  Brain parenchyma: No acute infarct is identified. Subtle scattered microvascular change is seen in portions of the periventricular and deep white matter tracts. There are chronic lacunar infarcts located in the left basal ganglia.  Cerebellum: Unremarkable.  Sella and skull base: The sella appears to be within normal limits for age.  Intracranial calcifications: Incidental note is made of bilateral choroid plexus calcification. Incidental note is made of some pineal region calcification.  Calvarium: No acute linear or depressed skull fracture is seen.    Maxillofacial  Structures:  Paranasal sinuses: The visualized paranasal sinuses appear clear with no significant mucoperiosteal thickening or air fluid levels identified.  Orbits: The orbits appear unremarkable.  Zygomatic arches: The zygomatic arches are intact and unremarkable.  Temporal bones and mastoids: The temporal bones and mastoids appear unremarkable.  TMJ: The mandibular condyles appear normally placed with respect to the mandibular fossa.                                         Medications   LIDOcaine HCL 10 mg/ml (1%) injection 10 mL (10 mLs Infiltration Given 3/28/25 0345)     Medical Decision Making  Judging by the patient's chief complaint and pertinent history, the patient has the following possible differential diagnoses, including but not limited to the following.  Some of these are deemed to be lower likelihood and some more likely based on my physical exam and history combined with possible lab work and/or imaging studies.   Please see the pertinent studies, and refer to the HPI.  Some of these diagnoses will take further evaluation to fully rule out, perhaps as an outpatient and the patient was encouraged to follow up when discharged for more comprehensive evaluation.      abrasion, contusion, fracture, traumatic ICH, TBI, concussion, spinal injury,     Patient is a 69-year-old male presents to emergency department for fall.  See HPI.  See physical exam.  Laceration to left eyebrow.  Airway intact equal breath sounds, circulation appears to be intact.  Laceration cleaned irrigated repaired.  CT imaging without any acute abnormalities.  On reassessment patient resting comfortably, no acute distress.  Able to stand and ambulate without difficulty.  Lab work as noted.  EKG without ST elevation.  On reassessment resting comfortably, no acute distress.  Reassessed patient.  Patient is resting comfortably.  Discussed all results.  Discussed need for follow-up.  Discussed return precautions.  Answered all questions  at this time.  Hemodynamically stable for continued outpatient management with strict return precautions.  Patient and family verbalized understanding agreed to plan.      Problems Addressed:  Contusion of head, unspecified part of head, initial encounter: acute illness or injury that poses a threat to life or bodily functions  Facial laceration, initial encounter: acute illness or injury that poses a threat to life or bodily functions  Fall, initial encounter: acute illness or injury that poses a threat to life or bodily functions    Amount and/or Complexity of Data Reviewed  Labs: ordered. Decision-making details documented in ED Course.  Radiology: ordered and independent interpretation performed. Decision-making details documented in ED Course.    Risk  Prescription drug management.  Parenteral controlled substances.  Decision regarding hospitalization.            Scribe Attestation:   Scribe #1: I performed the above scribed service and the documentation accurately describes the services I performed. I attest to the accuracy of the note.    Attending Attestation:           Physician Attestation for Scribe:  Physician Attestation Statement for Scribe #1: I, Bakari Devries MD, reviewed documentation, as scribed by Mila Arellano in my presence, and it is both accurate and complete.             ED Course as of 04/23/25 0650   Fri Mar 28, 2025   0649 Tdap up-to-date [RP]      ED Course User Index  [RP] Bakari Devries MD                           Clinical Impression:  Final diagnoses:  [R53.1] Weakness  [W19.XXXA] Fall, initial encounter (Primary)  [S00.93XA] Contusion of head, unspecified part of head, initial encounter  [S01.81XA] Facial laceration, initial encounter          ED Disposition Condition    Discharge Stable          ED Prescriptions    None       Follow-up Information       Follow up With Specialties Details Why Contact Info    Mike Escobar MD Internal Medicine   [address] [place]  [place] [address]  [phone]      Primary Care  Call in 1 day  Please call [phone] for a primary care provider.                 [1]   Social History  Tobacco Use    Smoking status: Never    Smokeless tobacco: Never   Substance Use Topics    Alcohol use: Not Currently    Drug use: Never        Bakari Devries MD  04/23/25 0652

## 2025-03-28 NOTE — ED NOTES
[facility],l gave report, and states will set up transport with facility .

## 2025-03-28 NOTE — DISCHARGE INSTRUCTIONS
Follow-up with your primary care physician.      Your stitches will dissolve on their own in 7-10 days.  If you have any redness, drainage from the wound return to the emergency department.    Return to the emergency department any new or worsening symptoms, weakness, numbness, fever, chest pain, shortness of breath, nausea, vomiting, or any other concerns.

## 2025-05-05 ENCOUNTER — LAB REQUISITION (OUTPATIENT)
Dept: LAB | Facility: HOSPITAL | Age: 70
End: 2025-05-05
Payer: MEDICARE

## 2025-05-05 DIAGNOSIS — E11.9 TYPE 2 DIABETES MELLITUS WITHOUT COMPLICATIONS: ICD-10-CM

## 2025-05-05 LAB
EST. AVERAGE GLUCOSE BLD GHB EST-MCNC: 162.8 MG/DL
HBA1C MFR BLD: 7.3 %

## 2025-05-05 PROCEDURE — 83036 HEMOGLOBIN GLYCOSYLATED A1C: CPT | Performed by: INTERNAL MEDICINE

## 2025-08-05 ENCOUNTER — LAB REQUISITION (OUTPATIENT)
Dept: LAB | Facility: HOSPITAL | Age: 70
End: 2025-08-05
Payer: MEDICARE

## 2025-08-05 DIAGNOSIS — E11.9 TYPE 2 DIABETES MELLITUS WITHOUT COMPLICATIONS: ICD-10-CM

## 2025-08-05 LAB
25(OH)D3+25(OH)D2 SERPL-MCNC: 13 NG/ML (ref 30–80)
ALBUMIN SERPL-MCNC: 3.3 G/DL (ref 3.4–4.8)
ALBUMIN/GLOB SERPL: 1 RATIO (ref 1.1–2)
ALP SERPL-CCNC: 94 UNIT/L (ref 40–150)
ALT SERPL-CCNC: 11 UNIT/L (ref 0–55)
ANION GAP SERPL CALC-SCNC: 8 MEQ/L
AST SERPL-CCNC: 12 UNIT/L (ref 11–45)
BASOPHILS # BLD AUTO: 0.1 X10(3)/MCL
BASOPHILS NFR BLD AUTO: 0.9 %
BILIRUB SERPL-MCNC: 0.4 MG/DL
BUN SERPL-MCNC: 15.6 MG/DL (ref 8.4–25.7)
CALCIUM SERPL-MCNC: 9.1 MG/DL (ref 8.8–10)
CHLORIDE SERPL-SCNC: 105 MMOL/L (ref 98–107)
CHOLEST SERPL-MCNC: 150 MG/DL
CHOLEST/HDLC SERPL: 3 {RATIO} (ref 0–5)
CO2 SERPL-SCNC: 27 MMOL/L (ref 23–31)
CREAT SERPL-MCNC: 0.79 MG/DL (ref 0.72–1.25)
CREAT/UREA NIT SERPL: 20
EOSINOPHIL # BLD AUTO: 0.34 X10(3)/MCL (ref 0–0.9)
EOSINOPHIL NFR BLD AUTO: 3 %
ERYTHROCYTE [DISTWIDTH] IN BLOOD BY AUTOMATED COUNT: 15.1 % (ref 11.5–17)
EST. AVERAGE GLUCOSE BLD GHB EST-MCNC: 194.4 MG/DL
GFR SERPLBLD CREATININE-BSD FMLA CKD-EPI: >60 ML/MIN/1.73/M2
GLOBULIN SER-MCNC: 3.2 GM/DL (ref 2.4–3.5)
GLUCOSE SERPL-MCNC: 173 MG/DL (ref 82–115)
HBA1C MFR BLD: 8.4 %
HCT VFR BLD AUTO: 40.5 % (ref 42–52)
HDLC SERPL-MCNC: 53 MG/DL (ref 35–60)
HGB BLD-MCNC: 12.5 G/DL (ref 14–18)
IMM GRANULOCYTES # BLD AUTO: 0.02 X10(3)/MCL (ref 0–0.04)
IMM GRANULOCYTES NFR BLD AUTO: 0.2 %
IRON SATN MFR SERPL: 16 % (ref 20–50)
IRON SERPL-MCNC: 48 UG/DL (ref 65–175)
LDLC SERPL CALC-MCNC: 81 MG/DL (ref 50–140)
LYMPHOCYTES # BLD AUTO: 1.54 X10(3)/MCL (ref 0.6–4.6)
LYMPHOCYTES NFR BLD AUTO: 13.6 %
MCH RBC QN AUTO: 27.1 PG (ref 27–31)
MCHC RBC AUTO-ENTMCNC: 30.9 G/DL (ref 33–36)
MCV RBC AUTO: 87.9 FL (ref 80–94)
MONOCYTES # BLD AUTO: 0.86 X10(3)/MCL (ref 0.1–1.3)
MONOCYTES NFR BLD AUTO: 7.6 %
NEUTROPHILS # BLD AUTO: 8.45 X10(3)/MCL (ref 2.1–9.2)
NEUTROPHILS NFR BLD AUTO: 74.7 %
NRBC BLD AUTO-RTO: 0 %
PLATELET # BLD AUTO: 183 X10(3)/MCL (ref 130–400)
PMV BLD AUTO: 12.4 FL (ref 7.4–10.4)
POTASSIUM SERPL-SCNC: 4.3 MMOL/L (ref 3.5–5.1)
PREALB SERPL-MCNC: 27.4 MG/DL (ref 16–42)
PROT SERPL-MCNC: 6.5 GM/DL (ref 5.8–7.6)
RBC # BLD AUTO: 4.61 X10(6)/MCL (ref 4.7–6.1)
SODIUM SERPL-SCNC: 140 MMOL/L (ref 136–145)
TIBC SERPL-MCNC: 257 UG/DL (ref 60–240)
TIBC SERPL-MCNC: 305 UG/DL (ref 250–450)
TRANSFERRIN SERPL-MCNC: 275 MG/DL (ref 163–344)
TRIGL SERPL-MCNC: 78 MG/DL (ref 34–140)
VLDLC SERPL CALC-MCNC: 16 MG/DL
WBC # BLD AUTO: 11.31 X10(3)/MCL (ref 4.5–11.5)

## 2025-08-05 PROCEDURE — 80061 LIPID PANEL: CPT | Performed by: INTERNAL MEDICINE

## 2025-08-05 PROCEDURE — 82306 VITAMIN D 25 HYDROXY: CPT | Performed by: INTERNAL MEDICINE

## 2025-08-05 PROCEDURE — 84134 ASSAY OF PREALBUMIN: CPT | Performed by: INTERNAL MEDICINE

## 2025-08-05 PROCEDURE — 83036 HEMOGLOBIN GLYCOSYLATED A1C: CPT | Performed by: INTERNAL MEDICINE

## 2025-08-05 PROCEDURE — 83540 ASSAY OF IRON: CPT | Performed by: INTERNAL MEDICINE

## 2025-08-05 PROCEDURE — 80053 COMPREHEN METABOLIC PANEL: CPT | Performed by: INTERNAL MEDICINE

## 2025-08-05 PROCEDURE — 85025 COMPLETE CBC W/AUTO DIFF WBC: CPT | Performed by: INTERNAL MEDICINE

## 2025-08-26 ENCOUNTER — LAB REQUISITION (OUTPATIENT)
Dept: LAB | Facility: HOSPITAL | Age: 70
End: 2025-08-26
Payer: MEDICARE

## 2025-08-26 DIAGNOSIS — I10 ESSENTIAL (PRIMARY) HYPERTENSION: ICD-10-CM

## 2025-08-26 LAB
ALBUMIN SERPL-MCNC: 3.2 G/DL (ref 3.4–4.8)
ALBUMIN/GLOB SERPL: 1 RATIO (ref 1.1–2)
ALP SERPL-CCNC: 87 UNIT/L (ref 40–150)
ALT SERPL-CCNC: 11 UNIT/L (ref 0–55)
ANION GAP SERPL CALC-SCNC: 9 MEQ/L
AST SERPL-CCNC: 13 UNIT/L (ref 11–45)
BASOPHILS # BLD AUTO: 0.11 X10(3)/MCL
BASOPHILS NFR BLD AUTO: 1.3 %
BILIRUB SERPL-MCNC: 0.3 MG/DL
BUN SERPL-MCNC: 15.5 MG/DL (ref 8.4–25.7)
CALCIUM SERPL-MCNC: 9.2 MG/DL (ref 8.8–10)
CHLORIDE SERPL-SCNC: 106 MMOL/L (ref 98–107)
CO2 SERPL-SCNC: 26 MMOL/L (ref 23–31)
CREAT SERPL-MCNC: 0.77 MG/DL (ref 0.72–1.25)
CREAT/UREA NIT SERPL: 20
EOSINOPHIL # BLD AUTO: 0.43 X10(3)/MCL (ref 0–0.9)
EOSINOPHIL NFR BLD AUTO: 5 %
ERYTHROCYTE [DISTWIDTH] IN BLOOD BY AUTOMATED COUNT: 15.2 % (ref 11.5–17)
GFR SERPLBLD CREATININE-BSD FMLA CKD-EPI: >60 ML/MIN/1.73/M2
GLOBULIN SER-MCNC: 3.1 GM/DL (ref 2.4–3.5)
GLUCOSE SERPL-MCNC: 105 MG/DL (ref 82–115)
HCT VFR BLD AUTO: 39.3 % (ref 42–52)
HGB BLD-MCNC: 12.2 G/DL (ref 14–18)
IMM GRANULOCYTES # BLD AUTO: 0.02 X10(3)/MCL (ref 0–0.04)
IMM GRANULOCYTES NFR BLD AUTO: 0.2 %
LYMPHOCYTES # BLD AUTO: 1.72 X10(3)/MCL (ref 0.6–4.6)
LYMPHOCYTES NFR BLD AUTO: 19.8 %
MCH RBC QN AUTO: 27.4 PG (ref 27–31)
MCHC RBC AUTO-ENTMCNC: 31 G/DL (ref 33–36)
MCV RBC AUTO: 88.3 FL (ref 80–94)
MONOCYTES # BLD AUTO: 0.8 X10(3)/MCL (ref 0.1–1.3)
MONOCYTES NFR BLD AUTO: 9.2 %
NEUTROPHILS # BLD AUTO: 5.59 X10(3)/MCL (ref 2.1–9.2)
NEUTROPHILS NFR BLD AUTO: 64.5 %
NRBC BLD AUTO-RTO: 0 %
PLATELET # BLD AUTO: 175 X10(3)/MCL (ref 130–400)
PMV BLD AUTO: 12.3 FL (ref 7.4–10.4)
POTASSIUM SERPL-SCNC: 4.1 MMOL/L (ref 3.5–5.1)
PREALB SERPL-MCNC: 26.3 MG/DL (ref 16–42)
PROT SERPL-MCNC: 6.3 GM/DL (ref 5.8–7.6)
RBC # BLD AUTO: 4.45 X10(6)/MCL (ref 4.7–6.1)
SODIUM SERPL-SCNC: 141 MMOL/L (ref 136–145)
T4 FREE SERPL-MCNC: 1.04 NG/DL (ref 0.7–1.48)
TSH SERPL-ACNC: 7.07 UIU/ML (ref 0.35–4.94)
WBC # BLD AUTO: 8.67 X10(3)/MCL (ref 4.5–11.5)

## 2025-08-26 PROCEDURE — 84443 ASSAY THYROID STIM HORMONE: CPT | Performed by: INTERNAL MEDICINE

## 2025-08-26 PROCEDURE — 80053 COMPREHEN METABOLIC PANEL: CPT | Performed by: INTERNAL MEDICINE

## 2025-08-26 PROCEDURE — 85025 COMPLETE CBC W/AUTO DIFF WBC: CPT | Performed by: INTERNAL MEDICINE

## 2025-08-26 PROCEDURE — 84439 ASSAY OF FREE THYROXINE: CPT | Performed by: INTERNAL MEDICINE

## 2025-08-26 PROCEDURE — 84134 ASSAY OF PREALBUMIN: CPT | Performed by: INTERNAL MEDICINE
